# Patient Record
Sex: FEMALE | Race: WHITE | NOT HISPANIC OR LATINO | Employment: OTHER | URBAN - METROPOLITAN AREA
[De-identification: names, ages, dates, MRNs, and addresses within clinical notes are randomized per-mention and may not be internally consistent; named-entity substitution may affect disease eponyms.]

---

## 2017-07-05 ENCOUNTER — OFFICE VISIT (OUTPATIENT)
Dept: OTOLARYNGOLOGY | Facility: CLINIC | Age: 67
End: 2017-07-05

## 2017-07-05 DIAGNOSIS — H71.93 CHOLESTEATOMA, BILATERAL: ICD-10-CM

## 2017-07-05 DIAGNOSIS — H90.6 MIXED HEARING LOSS, BILATERAL: Primary | ICD-10-CM

## 2017-07-05 ASSESSMENT — PAIN SCALES - GENERAL: PAINLEVEL: NO PAIN (0)

## 2017-07-05 NOTE — LETTER
"7/5/2017       RE: Valentina Pereyra  [address] [place] [address]  [place] [address]     Dear Colleague,    Thank you for referring your patient, Valentina Pereyra, to the [facility] EAR NOSE AND THROAT at [facility]. Please see a copy of my visit note below.    This is a 66-year-old woman who is seen in follow-up. She has had a long history of ear disease. Her right ear was operated on a number of years ago and has not changed. Her left ear has a Bondy mastoidectomy and she continues to have some hearing loss. When she manipulates her ear, she can improve her hearing in the left ear. The patient uses a bone-anchored hearing aid on the right. Recently she again has had an episode of \"dizziness\" and these episodes are short in duration. They do not associate in anyway with abnormal perception of motion suggestive of vertigo.    Exam: Exam today and her the microscope shows that the right drum looks exactly the same. It has no features and has a white center. The anterior surfaces blunted. A single small piece of cerumen was removed with an alligator.    The left ear has a myringostapediopexy and is thin and monomeric. The remainder of the drum is intact and looks healthy. The epitympanum still has some scar posteriorly there requires cleaning. There is no evidence for recurrent cholesteatoma or other infection. The good tube in the anterior inferior quadrant appears to be clean and is still in place.    While she was under the microscope I asked her to manipulate the year and I think that she lateralizes the drum slightly enough that it comes off of the end of the incus.    Audio: Late last year when we last recorded the audio she had a left hearing thresholds around 25 dB by PTA. The right ear continues to have a maximal conductive hearing loss. I have not recheck the audio today.    Impression: Possible decline in a better hearing left ear. I have talked with her about the possibility of " getting a new audio, getting a new scan and considering a cartilage tympanoplasty on the left were a bone-anchored hearing aid on the left. Considering that functionally this is her better hearing ear hesitate for surgical intervention. I am recommended that she see me again in 6 months and reevaluate her audio at that time.    Again, thank you for allowing me to participate in the care of your patient.      Sincerely,    Bruno Tijerina MD

## 2017-07-05 NOTE — MR AVS SNAPSHOT
After Visit Summary   7/5/2017    Valentina Pereyra    MRN: [id number]           Patient Information     Date Of Birth          1950        Visit Information        Provider Department      7/5/2017 10:45 AM Bruno Tijerina MD [facility] Ear Nose and Throat        Today's Diagnoses     Mixed hearing loss, bilateral    -  1    Cholesteatoma, bilateral          Care Instructions    You will need  to schedule a follow up appointment in 6 months with and audio.   Please call our clinic for any questions,concerns,or worsening symptoms.      Clinic #[phone]       Option 3  for the triage nurse.          Follow-ups after your visit        Additional Services     AUDIOLOGY ADULT REFERRAL       Your provider has referred you to: [facility] [phone]   Fax [phone]    Treatment:  Evaluation & Treatment  Specialty Testing:  Audiogram w/Tymps and Reflexes  PT WILL MANIPULATE EAR AND CAN CHANGE HEARING- PLEASE DOCUMENT CHANGES.    Please be aware that coverage of these services is subject to the terms and limitations of your health insurance plan.  Call member services at your health plan with any benefit or coverage questions.      Please bring the following to your appointment:  >>   Any x-rays, CTs or MRIs which have been performed.  Contact the facility where they were done to arrange for  prior to your scheduled appointment.  Any new CT, MRI or other procedures ordered by your specialist must be performed at a [facility] facility or coordinated by your clinic's referral office.   >>   List of current medications  >>   This referral request   >>   Any documents/labs given to you for this referral                  Your next 10 appointments already scheduled     Jan 05, 2018  9:30 AM CST   Walk In From ENT with [facility] Audiology ([facility])    [address] [place] [address]    [phone]            Jan 05, 2018 10:30 AM CST   (Arrive by 10:15 AM)   Return Visit with Bruno Tijerina MD   [facility] Ear Nose and Throat ([facility])    [address] [address] [phone]              Future tests that were ordered for you today     Open Future Orders        Priority Expected Expires Ordered    AUDIOLOGY ADULT REFERRAL Routine  1/1/2018 7/5/2017            Who to contact     Please call your clinic at [phone] to:    Ask questions about your health    Make or cancel appointments    Discuss your medicines    Learn about your test results    Speak to your doctor   If you have compliments or concerns about an experience at your clinic, or if you wish to file a complaint, please contact [facility] Patient Relations at [phone] or email us at [email or web address]         Additional Information About Your Visit        Grow the [employer] Information     [employer] gives you secure access to your electronic health record. If you see a primary care provider, you can also send messages to your care team and make appointments. If you have questions, please call your primary care clinic.  If you do not have a primary care provider, please call [phone] and they will assist you.      [employer] is an electronic gateway that provides easy, online access to your medical records. With [employer], you can request a clinic appointment, read your test results, renew a prescription or communicate with your care team.     To access your existing account, please contact your [facility] or call [phone] for assistance.        Care EveryWhere ID     This is your Care EveryWhere ID. This could be used by other organizations to access your [place] medical records  [id number]         Blood Pressure from Last 3 Encounters:   10/23/14 134/74   04/08/14 118/63   04/03/13 116/65    Weight  from Last 3 Encounters:   11/23/16 81.1 kg (178 lb 12.8 oz)   10/23/14 76.3 kg (168 lb 3.4 oz)   04/08/14 75.1 kg (165 lb 9.1 oz)               Primary Care Provider Office Phone # Fax #    German Zayas PA-C [phone]       [facility] [place] [address] [facility] 100  [facility] [address]        Equal Access to Services     ELSA KARIMI : Hadii aad ku hadasho Soomaali, waaxda luqadaha, qaybta kaalmada adeegyada, waxay idiin hayaan adeeg kharaedenilson lageronimo . So [facility] [phone].    ATENCIÓN: Si habla español, tiene a velásquez disposición servicios gratuitos de asistencia lingüística. [facility] [phone].    We comply with applicable federal civil rights laws and [place] laws. We do not discriminate on the basis of race, color, national origin, age, disability sex, sexual orientation or gender identity.            Thank you!     Thank you for choosing [facility] EAR NOSE AND THROAT  for your care. Our goal is always to provide you with excellent care. Hearing back from our patients is one way we can continue to improve our services. Please take a few minutes to complete the written survey that you may receive in the mail after your visit with us. Thank you!             Your Updated Medication List - Protect others around you: Learn how to safely use, store and throw away your medicines at www.disposemymeds.org.          This list is accurate as of: 7/5/17 10:50 AM.  Always use your most recent med list.                   Brand Name Dispense Instructions for use Diagnosis    ALPRAZolam 0.5 MG 24 hr tablet    XANAX XR     Take 0.5 mg by mouth daily as needed        ASPIR-81 PO      Take 81 mg by mouth daily        calcium carbonate-vitamin D 600-400 MG-UNIT Chew      Take 1 tablet by mouth daily        dicyclomine 10 MG capsule    BENTYL     Take 10 mg by mouth daily as needed        escitalopram 20 MG tablet    LEXAPRO     Take 20 mg by mouth daily.        FOSAMAX 70 MG tablet   Generic drug:  alendronate       Take 70 mg by mouth every 7 days Mondays        PROBIOTIC DAILY PO           VITAMIN D3 PO      Take 1,000 Units by mouth daily

## 2017-07-05 NOTE — PATIENT INSTRUCTIONS
You will need  to schedule a follow up appointment in 6 months with and audio.   Please call our clinic for any questions,concerns,or worsening symptoms.      Clinic #[phone]       Option 3  for the triage nurse.

## 2017-07-05 NOTE — PROGRESS NOTES
"This is a 66-year-old woman who is seen in follow-up. She has had a long history of ear disease. Her right ear was operated on a number of years ago and has not changed. Her left ear has a Bondy mastoidectomy and she continues to have some hearing loss. When she manipulates her ear, she can improve her hearing in the left ear. The patient uses a bone-anchored hearing aid on the right. Recently she again has had an episode of \"dizziness\" and these episodes are short in duration. They do not associate in anyway with abnormal perception of motion suggestive of vertigo.    Exam: Exam today and her the microscope shows that the right drum looks exactly the same. It has no features and has a white center. The anterior surfaces blunted. A single small piece of cerumen was removed with an alligator.    The left ear has a myringostapediopexy and is thin and monomeric. The remainder of the drum is intact and looks healthy. The epitympanum still has some scar posteriorly there requires cleaning. There is no evidence for recurrent cholesteatoma or other infection. The good tube in the anterior inferior quadrant appears to be clean and is still in place.    While she was under the microscope I asked her to manipulate the year and I think that she lateralizes the drum slightly enough that it comes off of the end of the incus.    Audio: Late last year when we last recorded the audio she had a left hearing thresholds around 25 dB by PTA. The right ear continues to have a maximal conductive hearing loss. I have not recheck the audio today.    Impression: Possible decline in a better hearing left ear. I have talked with her about the possibility of getting a new audio, getting a new scan and considering a cartilage tympanoplasty on the left were a bone-anchored hearing aid on the left. Considering that functionally this is her better hearing ear hesitate for surgical intervention. I am recommended that she see me again in 6 months " and reevaluate her audio at that time.

## 2017-07-11 LAB — MAMMOGRAM: NORMAL

## 2018-01-05 ENCOUNTER — OFFICE VISIT (OUTPATIENT)
Dept: OTOLARYNGOLOGY | Facility: CLINIC | Age: 68
End: 2018-01-05
Payer: COMMERCIAL

## 2018-01-05 ENCOUNTER — OFFICE VISIT (OUTPATIENT)
Dept: AUDIOLOGY | Facility: CLINIC | Age: 68
End: 2018-01-05
Payer: COMMERCIAL

## 2018-01-05 DIAGNOSIS — H90.6 MIXED HEARING LOSS, BILATERAL: Primary | ICD-10-CM

## 2018-01-05 DIAGNOSIS — H69.93 DYSFUNCTION OF BOTH EUSTACHIAN TUBES: Primary | ICD-10-CM

## 2018-01-05 ASSESSMENT — PAIN SCALES - GENERAL: PAINLEVEL: NO PAIN (0)

## 2018-01-05 NOTE — PROGRESS NOTES
This is a 67-year-old patient who is seen with continued hearing loss.    History of Present Illness:: The patient has had a long history of ear infections and problems. She is using a bone anchored hearing aid on the right and has a lateralized drum on that side. She is had a long-standing conductive hearing loss which is maximal on the right. On the left side she has a tube that is in place and has a history of a myringoincudopexy on the left. This is her better hearing ear and she notes that the hearing seems to fluctuate bipolaring on her earlobe. When she does this she experiences some improvement in her hearing.    Medically there are no new findings and she is not on any other new medications.    She has not had any new symptoms.    Examination: Exam under the operating microscope shows the lateralized drum on the right is status quo. The Baha site is okay. The left ear has a Lukas tube in place and it is encased in cerumen. The cerumen partially obstructing my view of the drum. In manipulating it though the patient experienced pain and I cannot effectively see completely around it. I note that when she pulls on her earlobe that the wax separates from the ear canal wall.    Audiogram: A repeat audio was obtained today and shows that there is a maximal conductive hearing loss in the right ear and the left ear has very little change in the hearing threshold. It was previously a pure-tone average of 38 dB and today it is 37 dB.    Impression: Eustachian tube dysfunction on the left which is controlled by a PE tube. The tube appears to be partially extruding and the cerumen is causing some conductive loss.    Plan: I talked with her about the option of removing it today or waiting for it to completely extrude on its own. I plan to see her again in about six months time and we will continue to observe the tube.      ELZBIETA/ms

## 2018-01-05 NOTE — PROGRESS NOTES
"AUDIOLOGY REPORT    SUBJECTIVE:  Valentina Pereyra is a 67 year old female who was seen in the Audiology Clinic at the [facility] for audiologic evaluation and hearing aid check, referred by Bruno Tijerina MD.  The patient has been seen previously in this clinic on 11/23/16 for assessment and results indicated a bilateral asymmetric mixed hearing loss.  A patient utilizes a right Oticon Ponto Plus Bone anchored hearing aid (BAHA). The patient reports that hearing is relatively stable but noted that if she tugs on her left ear, the hearing seems to improve.  She also reports dizziness which she stated has been ongoing for a long time but that it is stable and has not caused any falls. The patient denies bilateral tinnitus, drainage in the right ear, bilateral drainage, and bilateral aural fullness.  The patient notes difficulty with communication in a variety of listening situations when not using her BAHA.    She reports that last Fall 2017, she was experiencing some feedback and \"echo\" issues with the BAHA.  However, she reports that the problem has since resolved itself.    OBJECTIVE:  Fall Risk Screen:  1. Have you fallen two or more times in the past year? No  2. Have you fallen and had an injury in the past year? No    Otoscopic exam indicates ears are clear of cerumen bilaterally     Pure Tone Thresholds assessed using conventional audiometry with good reliability from 250-8000 Hz bilaterally using insert earphones and circumaural headphones     RIGHT:  Moderately-severe to profound mixed hearing loss    LEFT:    Mild rising to normal sloping to severe mixed hearing loss    Tympanogram:  Did not test due to surgical hx    Reflexes (reported by stimulus ear):  Did not test due to surgical hx    Speech Reception Threshold:    RIGHT: 65 dB HL    LEFT:   30 dB HL    Word Recognition Score:     RIGHT: 100% at 95 dB HL using NU-6 recorded word list.    LEFT:   100% at 70 " dB HL using NU-6 recorded word list.    A BAHA check was performed.  The abutment site was visualized and was free of any debris.  The BAHA was clean and all parts were functioning normally.  The BAHA fit tightly and appropriately on to the abutment.  A listening check revealed that the device sounded crisp and clear with no distortion or weakness detected.  Patient reported that she was very happy with the sound quality of the device and did not want any programming changes.  It was discussed that the device could be sent in for repair for the problems that she was experiencing last Fall, but because the problems resolved themselves, she did not wish to send it in to the . Valentina reports that she continues to receive good benefit from the device and is overall very happy with the device's performance.      ASSESSMENT:     Compared to patient's previous audiogram dated 11/23/16, hearing has decreased 20 dB at 4 kHz in the right ear, all other thresholds are within 10 dB of the previous evaluation. Today s results were discussed with the patient in detail. A check of the bone anchored hearing aid was performed and the device was found to be functioning well.    PLAN:  Patient was counseled regarding hearing loss and impact on communication.  It is recommended that the patient follow-up with Dr. Tijerina.  It was also recommended that she have her hearing evaluated as medically indicated, sooner if concerns arise.  Patient is welcome to return to the clinic as needed for any BAHA device concerns.  Please call this clinic with questions regarding these results or recommendations.        Andreina Kimball  Audiologist  [place] License  #[id number]

## 2018-01-05 NOTE — LETTER
1/5/2018      RE: Valentina Pereyra  [address] [place] [address]  [place] [address]       This is a 67-year-old patient who is seen with continued hearing loss.    History of Present Illness:: The patient has had a long history of ear infections and problems. She is using a bone anchored hearing aid on the right and has a lateralized drum on that side. She is had a long-standing conductive hearing loss which is maximal on the right. On the left side she has a tube that is in place and has a history of a myringoincudopexy on the left. This is her better hearing ear and she notes that the hearing seems to fluctuate bipolaring on her earlobe. When she does this she experiences some improvement in her hearing.    Medically there are no new findings and she is not on any other new medications.    She has not had any new symptoms.    Examination: Exam under the operating microscope shows the lateralized drum on the right is status quo. The Baha site is okay. The left ear has a Lukas tube in place and it is encased in cerumen. The cerumen partially obstructing my view of the drum. In manipulating it though the patient experienced pain and I cannot effectively see completely around it. I note that when she pulls on her earlobe that the wax separates from the ear canal wall.    Audiogram: A repeat audio was obtained today and shows that there is a maximal conductive hearing loss in the right ear and the left ear has very little change in the hearing threshold. It was previously a pure-tone average of 38 dB and today it is 37 dB.    Impression: Eustachian tube dysfunction on the left which is controlled by a PE tube. The tube appears to be partially extruding and the cerumen is causing some conductive loss.    Plan: I talked with her about the option of removing it today or waiting for it to completely extrude on its own. I plan to see her again in about six months time and we will continue to observe the tube.      ELZBIETA/ms Carr  ALICIA Tijerina MD

## 2018-01-05 NOTE — MR AVS SNAPSHOT
After Visit Summary   1/5/2018    Valentina Pereyra    MRN: [id number]           Patient Information     Date Of Birth          1950        Visit Information        Provider Department      1/5/2018 10:30 AM Bruno Tijerina MD [facility] Ear Nose and Throat        Care Instructions    You will need  to schedule a follow up appointment in 6 months   Please call our clinic for any questions,concerns,or worsening symptoms.      Clinic #[phone]       Option 3  for the triage nurse.          Follow-ups after your visit        Your next 10 appointments already scheduled     Jul 06, 2018 10:30 AM CDT   (Arrive by 10:15 AM)   RETURN NEUROTOLOGY with MD GEORGIE Maldonado [facility] Ear Nose and Throat ([facility])    [address] [facility]  [address] [facility] [address] [phone]              Who to contact     Please call your clinic at [phone] to:    Ask questions about your health    Make or cancel appointments    Discuss your medicines    Learn about your test results    Speak to your doctor   If you have compliments or concerns about an experience at your clinic, or if you wish to file a complaint, please contact [facility] Patient Relations at [phone] or email us at [email or web address]         Additional Information About Your Visit        MyChart Information     [employer] gives you secure access to your electronic health record. If you see a primary care provider, you can also send messages to your care team and make appointments. If you have questions, please call your primary care clinic.  If you do not have a primary care provider, please call [phone] and they will assist you.      [employer] is an electronic gateway that provides easy, online access to your medical records. With [employer], you can request a clinic appointment, read your test results, renew a prescription or communicate with your care team.     To  Orthopedic access your existing account, please contact your [facility] or call [phone] for assistance.        Care EveryWhere ID     This is your Care EveryWhere ID. This could be used by other organizations to access your [place] medical records  [id number]         Blood Pressure from Last 3 Encounters:   10/23/14 134/74   04/08/14 118/63   04/03/13 116/65    Weight from Last 3 Encounters:   11/23/16 81.1 kg (178 lb 12.8 oz)   10/23/14 76.3 kg (168 lb 3.4 oz)   04/08/14 75.1 kg (165 lb 9.1 oz)              Today, you had the following     No orders found for display       Primary Care Provider Office Phone # Fax #    German Zayas PA-C [phone]       [facility] [address] [facility] [address]  [facility] [address]        Equal Access to Services     ALBINO KARIMI : Hadii aad ku hadasho Soomaali, waaxda luqadaha, qaybta kaalmada adeegyada, waxay idiin hayaan adeeg bird da silva . So [facility] [phone].    ATENCIÓN: Si habla español, tiene a velásquez disposición servicios gratuitos de asistencia lingüística. Connie al [phone].    We comply with applicable federal civil rights laws and [place] laws. We do not discriminate on the basis of race, color, national origin, age, disability, sex, sexual orientation, or gender identity.            Thank you!     Thank you for choosing [facility] EAR NOSE AND THROAT  for your care. Our goal is always to provide you with excellent care. Hearing back from our patients is one way we can continue to improve our services. Please take a few minutes to complete the written survey that you may receive in the mail after your visit with us. Thank you!             Your Updated Medication List - Protect others around you: Learn how to safely use, store and throw away your medicines at www.disposemymeds.org.          This list is accurate as of: 1/5/18 10:53 AM.  Always use your most recent med list.                   Brand Name Dispense  Instructions for use Diagnosis    ALPRAZolam 0.5 MG 24 hr tablet    XANAX XR     Take 0.5 mg by mouth daily as needed        ASPIR-81 PO      Take 81 mg by mouth daily        calcium carbonate-vitamin D 600-400 MG-UNIT Chew      Take 1 tablet by mouth daily        dicyclomine 10 MG capsule    BENTYL     Take 10 mg by mouth daily as needed        escitalopram 20 MG tablet    LEXAPRO     Take 20 mg by mouth daily.        FOSAMAX 70 MG tablet   Generic drug:  alendronate      Take 70 mg by mouth every 7 days Mondays        PROBIOTIC DAILY PO           VITAMIN D3 PO      Take 1,000 Units by mouth daily

## 2018-01-05 NOTE — LETTER
1/5/2018     RE: Valentina Pereyra  [address] [place] [address]  [place] [address]     Dear Colleague,    Thank you for referring your patient, Valentina Pereyra, to the [facility] EAR NOSE AND THROAT at [facility]. Please see a copy of my visit note below.    This is a 67-year-old patient who is seen with continued hearing loss.    History of Present Illness:: The patient has had a long history of ear infections and problems. She is using a bone anchored hearing aid on the right and has a lateralized drum on that side. She is had a long-standing conductive hearing loss which is maximal on the right. On the left side she has a tube that is in place and has a history of a myringoincudopexy on the left. This is her better hearing ear and she notes that the hearing seems to fluctuate bipolaring on her earlobe. When she does this she experiences some improvement in her hearing.    Medically there are no new findings and she is not on any other new medications.    She has not had any new symptoms.    Examination: Exam under the operating microscope shows the lateralized drum on the right is status quo. The Baha site is okay. The left ear has a Lukas tube in place and it is encased in cerumen. The cerumen partially obstructing my view of the drum. In manipulating it though the patient experienced pain and I cannot effectively see completely around it. I note that when she pulls on her earlobe that the wax separates from the ear canal wall.    Audiogram: A repeat audio was obtained today and shows that there is a maximal conductive hearing loss in the right ear and the left ear has very little change in the hearing threshold. It was previously a pure-tone average of 38 dB and today it is 37 dB.    Impression: Eustachian tube dysfunction on the left which is controlled by a PE tube. The tube appears to be partially extruding and the cerumen is causing some conductive loss.    Plan: I talked with her  about the option of removing it today or waiting for it to completely extrude on its own. I plan to see her again in about six months time and we will continue to observe the tube.      SL/ms  Bruno Tijerina MD

## 2018-01-05 NOTE — PATIENT INSTRUCTIONS
You will need  to schedule a follow up appointment in 6 months   Please call our clinic for any questions,concerns,or worsening symptoms.      Clinic #[phone]       Option 3  for the triage nurse.

## 2018-01-05 NOTE — MR AVS SNAPSHOT
After Visit Summary   1/5/2018    Valentina Pereyra    MRN: [id number]           Patient Information     Date Of Birth          1950        Visit Information        Provider Department      1/5/2018 8:30 AM Sonja Weller AuD [facility] Audiology        Today's Diagnoses     Mixed hearing loss, bilateral    -  1       Follow-ups after your visit        Your next 10 appointments already scheduled     Jul 06, 2018 10:30 AM CDT   (Arrive by 10:15 AM)   RETURN NEUROTOLOGY with Bruno Tijerina MD   [facility] Ear Nose and Throat ([facility] [place])    [address] [place] [address] [phone]              Who to contact     Please call your clinic at [phone] to:    Ask questions about your health    Make or cancel appointments    Discuss your medicines    Learn about your test results    Speak to your doctor   If you have compliments or concerns about an experience at your clinic, or if you wish to file a complaint, please contact [facility] Physicians Patient Relations at [phone] or email us at [email or web address]         Additional Information About Your Visit        iTaggithart Information     [employer] gives you secure access to your electronic health record. If you see a primary care provider, you can also send messages to your care team and make appointments. If you have questions, please call your primary care clinic.  If you do not have a primary care provider, please call [phone] and they will assist you.      [employer] is an electronic gateway that provides easy, online access to your medical records. With [employer], you can request a clinic appointment, read your test results, renew a prescription or communicate with your care team.     To access your existing account, please contact your [facility] Clinic or call [phone] for assistance.        Care EveryWhere ID     This is your  Care EveryWhere ID. This could be used by other organizations to access your [place] medical records  [id number]         Blood Pressure from Last 3 Encounters:   10/23/14 134/74   04/08/14 118/63   04/03/13 116/65    Weight from Last 3 Encounters:   11/23/16 81.1 kg (178 lb 12.8 oz)   10/23/14 76.3 kg (168 lb 3.4 oz)   04/08/14 75.1 kg (165 lb 9.1 oz)              We Performed the Following     AUDIOGRAM/TYMPANOGRAM - INTERFACE     BAHA - Fit or F/U (77234)     [facility] Thr[facility] Eval & Speech Recog (63252)        Primary Care Provider Office Phone # Fax #    German Zayas PA-C [phone]       [facility] [address] [facility] [address]  [facility] [address]        Equal Access to Services     ALBINO KARIMI : Hadii aad ku hadasho Soomaali, waaxda luqadaha, qaybta kaalmada adeegyada, waxay idiin hayaan constantin da silva . So [facility] [phone].    ATENCIÓN: Si habla español, tiene a velásquez disposición servicios gratuitos de asistencia lingüística. Connie al [phone].    We comply with applicable federal civil rights laws and [place] laws. We do not discriminate on the basis of race, color, national origin, age, disability, sex, sexual orientation, or gender identity.            Thank you!     Thank you for choosing [facility] AUDIOLOGY  for your care. Our goal is always to provide you with excellent care. Hearing back from our patients is one way we can continue to improve our services. Please take a few minutes to complete the written survey that you may receive in the mail after your visit with us. Thank you!             Your Updated Medication List - Protect others around you: Learn how to safely use, store and throw away your medicines at www.disposemymeds.org.          This list is accurate as of: 1/5/18  2:14 PM.  Always use your most recent med list.                   Brand Name Dispense Instructions for use Diagnosis    ALPRAZolam 0.5 MG 24 hr tablet    XANAX XR     Take 0.5 mg by  mouth daily as needed        ASPIR-81 PO      Take 81 mg by mouth daily        calcium carbonate-vitamin D 600-400 MG-UNIT Chew      Take 1 tablet by mouth daily        dicyclomine 10 MG capsule    BENTYL     Take 10 mg by mouth daily as needed        escitalopram 20 MG tablet    LEXAPRO     Take 20 mg by mouth daily.        FOSAMAX 70 MG tablet   Generic drug:  alendronate      Take 70 mg by mouth every 7 days Mondays        PROBIOTIC DAILY PO           VITAMIN D3 PO      Take 1,000 Units by mouth daily

## 2018-07-06 ENCOUNTER — OFFICE VISIT (OUTPATIENT)
Dept: OTOLARYNGOLOGY | Facility: CLINIC | Age: 68
End: 2018-07-06
Payer: COMMERCIAL

## 2018-07-06 VITALS — WEIGHT: 180 LBS | HEIGHT: 69 IN | BODY MASS INDEX: 26.66 KG/M2

## 2018-07-06 DIAGNOSIS — H69.93 DYSFUNCTION OF BOTH EUSTACHIAN TUBES: Primary | ICD-10-CM

## 2018-07-06 DIAGNOSIS — H90.6 MIXED HEARING LOSS, BILATERAL: ICD-10-CM

## 2018-07-06 RX ORDER — ASPIRIN 81 MG/1
TABLET, CHEWABLE ORAL
COMMUNITY
End: 2019-07-09

## 2018-07-06 ASSESSMENT — PAIN SCALES - GENERAL: PAINLEVEL: NO PAIN (0)

## 2018-07-06 NOTE — LETTER
7/6/2018      RE: Valentina Pereyra  [address] [place] [address]  [place] [address]       History of Present Illness:  This is a 67-year-old patient who has bilateral ear problems. The patient notes that her hearing seems to be just a little worse in her left ear but otherwise she has been doing fairly well.    Her last surgical procedure, which was October 2014, was a left wall down tympanomastoidectomy. A Lukas T-tube was placed as part of this. Since then, she notes that when she manipulates the ear sometimes she can hear better with it other times less well. She has had no drainage or pain from her right ear    Physical Examination: The exam with the microscope shows that the left wall down cavity is clean and looks mature. There is a significant amount of cerumen surrounding the tube and most of it was removed today. The drum has collapsed and now has a myringoincudopexy there is also contact with the inferior part just below the stapes. The tube appears to be in the middle ear and is functional. The anterior drum looks healthier. The remainder of the mastoid cavity has mature skin and there is a thin membrane covering the sinodural angle.    On the right side, the drum is lateralized but there is a white mass inferiorly in the ear. I have seen this before and noted it. I do not think that it has changed substantially. This ear was last operated on in April 2012 with a revision procedure and a TORP. I think that I am looking at the end of the TORP on the right ear.    Impression: Stable eustachian tube dysfunction bilaterally with mostly conductive hearing loss bilaterally.    Plan: I talked with her about the findings and recommended reexamination in six months.      SL/ms    Bruno Tijerina MD

## 2018-07-06 NOTE — PATIENT INSTRUCTIONS
You will need  to schedule a follow up appointment in 6 months for ear cleaning.   Please call our clinic for any questions,concerns,or worsening symptoms.      Clinic #[phone]       Option 3  for the triage nurse.

## 2018-07-06 NOTE — NURSING NOTE
Chief Complaint   Patient presents with     RECHECK     6 month follow up. hearing in left ear feels worse.      Ozzy Morris, EMT

## 2018-07-06 NOTE — MR AVS SNAPSHOT
"              After Visit Summary   7/6/2018    Valentina Pereyra    MRN: [id number]           Patient Information     Date Of Birth          1950        Visit Information        Provider Department      7/6/2018 10:30 AM Bruno Tijerina MD M [facility] Ear Nose and Throat         Follow-ups after your visit        Your next 10 appointments already scheduled     Jan 04, 2019 10:45 AM CST   (Arrive by 10:30 AM)   RETURN NEUROTOLOGY with MD GEORGIE Maldonado [facility] Ear Nose and Throat ([facility])    [address] [place] [address] [phone]              Who to contact     Please call your clinic at [phone] to:    Ask questions about your health    Make or cancel appointments    Discuss your medicines    Learn about your test results    Speak to your doctor            Additional Information About Your Visit        MyChart Information     [employer] gives you secure access to your electronic health record. If you see a primary care provider, you can also send messages to your care team and make appointments. If you have questions, please call your primary care clinic.  If you do not have a primary care provider, please call [phone] and they will assist you.      [employer] is an electronic gateway that provides easy, online access to your medical records. With [employer], you can request a clinic appointment, read your test results, renew a prescription or communicate with your care team.     To access your existing account, please contact your [facility] Clinic or call [phone] for assistance.        Care EveryWhere ID     This is your Care EveryWhere ID. This could be used by other organizations to access your [place] medical records  [id number]        Your Vitals Were     Height BMI (Body Mass Index)                1.753 m (5' 9\") 26.58 kg/m2           Blood Pressure from Last 3 Encounters:   10/23/14 134/74   04/08/14 118/63 "   04/03/13 116/65    Weight from Last 3 Encounters:   07/06/18 81.6 kg (180 lb)   11/23/16 81.1 kg (178 lb 12.8 oz)   10/23/14 76.3 kg (168 lb 3.4 oz)              Today, you had the following     No orders found for display       Primary Care Provider Office Phone # Fax #    Germananamaria Zayas PA-C [phone]       [facility] [address] [facility] [address]  [facility] [address]        Equal Access to Services     [facility]: Hadii aad ku hadasho Soomaali, waaxda luqadaha, qaybta kaalmada adeegyada, waxay jyotiin hayliliana ademac da silva . So [facility] [phone].    ATENCIÓN: Si habla español, tiene a velásquez disposición servicios gratuitos de asistencia lingüística. [facility] [phone].    We comply with applicable federal civil rights laws and [place] laws. We do not discriminate on the basis of race, color, national origin, age, disability, sex, sexual orientation, or gender identity.            Thank you!     Thank you for choosing [facility] EAR NOSE AND THROAT  for your care. Our goal is always to provide you with excellent care. Hearing back from our patients is one way we can continue to improve our services. Please take a few minutes to complete the written survey that you may receive in the mail after your visit with us. Thank you!             Your Updated Medication List - Protect others around you: Learn how to safely use, store and throw away your medicines at www.disposemymeds.org.          This list is accurate as of 7/6/18 11:08 AM.  Always use your most recent med list.                   Brand Name Dispense Instructions for use Diagnosis    ALPRAZolam 0.5 MG 24 hr tablet    XANAX XR     Take 0.5 mg by mouth daily as needed        * ASPIR-81 PO      Take 81 mg by mouth daily        * aspirin 81 MG chewable tablet           calcium carbonate-vitamin D 600-400 MG-UNIT Chew      Take 1 tablet by mouth daily        dicyclomine 10 MG capsule    BENTYL     Take 10 mg by mouth daily as needed         escitalopram 20 MG tablet    LEXAPRO     Take 20 mg by mouth daily.        FOSAMAX 70 MG tablet   Generic drug:  alendronate      Take 70 mg by mouth every 7 days Mondays        PROBIOTIC DAILY PO           VITAMIN D3 PO      Take 1,000 Units by mouth daily        * Notice:  This list has 2 medication(s) that are the same as other medications prescribed for you. Read the directions carefully, and ask your doctor or other care provider to review them with you.

## 2018-07-06 NOTE — LETTER
7/6/2018       RE: Valentina Pereyra  [address] [place] [address]  [place] [address]     Dear Colleague,    Thank you for referring your patient, Valentina Pereyra, to the [facility] EAR NOSE AND THROAT at [facility]. Please see a copy of my visit note below.    History of Present Illness:  This is a 67-year-old patient who has bilateral ear problems. The patient notes that her hearing seems to be just a little worse in her left ear but otherwise she has been doing fairly well.    Her last surgical procedure, which was October 2014, was a left wall down tympanomastoidectomy. A Lukas T-tube was placed as part of this. Since then, she notes that when she manipulates the ear sometimes she can hear better with it other times less well. She has had no drainage or pain from her right ear    Physical Examination: The exam with the microscope shows that the left wall down cavity is clean and looks mature. There is a significant amount of cerumen surrounding the tube and most of it was removed today. The drum has collapsed and now has a myringoincudopexy there is also contact with the inferior part just below the stapes. The tube appears to be in the middle ear and is functional. The anterior drum looks healthier. The remainder of the mastoid cavity has mature skin and there is a thin membrane covering the sinodural angle.    On the right side, the drum is lateralized but there is a white mass inferiorly in the ear. I have seen this before and noted it. I do not think that it has changed substantially. This ear was last operated on in April 2012 with a revision procedure and a TORP. I think that I am looking at the end of the TORP on the right ear.    Impression: Stable eustachian tube dysfunction bilaterally with mostly conductive hearing loss bilaterally.    Plan: I talked with her about the findings and recommended reexamination in six months.      SL/ms    Again, thank you for allowing me to  participate in the care of your patient.      Sincerely,    Bruno Tijerina MD

## 2018-07-06 NOTE — PROGRESS NOTES
History of Present Illness:  This is a 67-year-old patient who has bilateral ear problems. The patient notes that her hearing seems to be just a little worse in her left ear but otherwise she has been doing fairly well.    Her last surgical procedure, which was October 2014, was a left wall down tympanomastoidectomy. A Lukas T-tube was placed as part of this. Since then, she notes that when she manipulates the ear sometimes she can hear better with it other times less well. She has had no drainage or pain from her right ear    Physical Examination: The exam with the microscope shows that the left wall down cavity is clean and looks mature. There is a significant amount of cerumen surrounding the tube and most of it was removed today. The drum has collapsed and now has a myringoincudopexy there is also contact with the inferior part just below the stapes. The tube appears to be in the middle ear and is functional. The anterior drum looks healthier. The remainder of the mastoid cavity has mature skin and there is a thin membrane covering the sinodural angle.    On the right side, the drum is lateralized but there is a white mass inferiorly in the ear. I have seen this before and noted it. I do not think that it has changed substantially. This ear was last operated on in April 2012 with a revision procedure and a TORP. I think that I am looking at the end of the TORP on the right ear.    Impression: Stable eustachian tube dysfunction bilaterally with mostly conductive hearing loss bilaterally.    Plan: I talked with her about the findings and recommended reexamination in six months.      ELZBIETA/ms

## 2018-07-18 LAB — MAMMOGRAM: NORMAL

## 2018-12-21 ENCOUNTER — PATIENT OUTREACH (OUTPATIENT)
Dept: CARE COORDINATION | Facility: CLINIC | Age: 68
End: 2018-12-21

## 2019-01-04 ENCOUNTER — OFFICE VISIT (OUTPATIENT)
Dept: OTOLARYNGOLOGY | Facility: CLINIC | Age: 69
End: 2019-01-04
Payer: COMMERCIAL

## 2019-01-04 DIAGNOSIS — H90.6 MIXED CONDUCTIVE AND SENSORINEURAL HEARING LOSS OF BOTH EARS: ICD-10-CM

## 2019-01-04 DIAGNOSIS — H72.92 EAR DRUM PERFORATION, LEFT: Primary | ICD-10-CM

## 2019-01-04 DIAGNOSIS — H61.23 BILATERAL IMPACTED CERUMEN: ICD-10-CM

## 2019-01-04 ASSESSMENT — PAIN SCALES - GENERAL: PAINLEVEL: NO PAIN (0)

## 2019-01-04 NOTE — LETTER
1/4/2019       RE: Valentina Pereyra  [address] [facility]  [place] [address]     Dear Colleague,    Thank you for referring your patient, Valentina Pereyra, to the [facility] EAR NOSE AND THROAT at [facility]. Please see a copy of my visit note below.    This is a 68-year-old patient with known bilateral ear disease.  She has had a long-standing conductive hearing loss in her right ear which is not changed.  She reports that her BAHA works great.  Her left ear feels like it is getting a little bit worse.    Exam: Exam under the operating microscope shows that the right ear has the lateralized graft with the thick cartilage underneath it.  The anterior blunting is still obvious.  The left ear has a wall down mastoid cavity that was easily cleaned of dry cerumen.  An alligator forcep and right angle hook were used with a curette.  The good tube has some crusting around it and I was able to liberate that material using a straight pick and alligator.  The rest of the ear looks healthy.    There is no obvious drainage    Impression: Last year her audio showed the findings consistent with this pattern.  I do feel that the cavity needs to be cleaned a couple of times a year but otherwise she should be fine.  I did not order new audio today since her tuning fork test is consistent with her prior exam.    Plan: She will follow-up in approximately 6 months with 1 of my colleagues.    Again, thank you for allowing me to participate in the care of your patient.      Sincerely,    Bruno Tijerina MD

## 2019-01-04 NOTE — PATIENT INSTRUCTIONS
You will need  to schedule a follow up appointment in 6 months with Dr. Nissen with a new hearing test.        Please call our clinic for any questions,concerns,or worsening symptoms.      Clinic #[phone]       Option 3  for the ENT Care Team.       Option 1 for scheduling.    Caitie KAPLAN RNCC  [phone]

## 2019-01-04 NOTE — PROGRESS NOTES
This is a 68-year-old patient with known bilateral ear disease.  She has had a long-standing conductive hearing loss in her right ear which is not changed.  She reports that her BAHA works great.  Her left ear feels like it is getting a little bit worse.    Exam: Exam under the operating microscope shows that the right ear has the lateralized graft with the thick cartilage underneath it.  The anterior blunting is still obvious.  The left ear has a wall down mastoid cavity that was easily cleaned of dry cerumen.  An alligator forcep and right angle hook were used with a curette.  The good tube has some crusting around it and I was able to liberate that material using a straight pick and alligator.  The rest of the ear looks healthy.    There is no obvious drainage    Impression: Last year her audio showed the findings consistent with this pattern.  I do feel that the cavity needs to be cleaned a couple of times a year but otherwise she should be fine.  I did not order new audio today since her tuning fork test is consistent with her prior exam.    Plan: She will follow-up in approximately 6 months with 1 of my colleagues.

## 2019-01-05 PROBLEM — H90.6 MIXED CONDUCTIVE AND SENSORINEURAL HEARING LOSS OF BOTH EARS: Status: ACTIVE | Noted: 2019-01-05

## 2019-01-05 PROBLEM — H72.92 EAR DRUM PERFORATION, LEFT: Status: ACTIVE | Noted: 2019-01-05

## 2019-02-19 ENCOUNTER — TELEPHONE (OUTPATIENT)
Dept: AUDIOLOGY | Facility: CLINIC | Age: 69
End: 2019-02-19

## 2019-02-19 NOTE — TELEPHONE ENCOUNTER
"Spoke with patient, she reports that it has been a gradual increase of an \"echo\" over the last week that she only experiences when she talks. Discussed that she should make sure that the abutment it screwed tightly and that there is no debris or hair around the abutment. She expressed understanding.    If that does not resolve the echo then she should come in for some programming adjustments if she desires. The last option would be to send the device in for repair, the device is no longer in warranty and so it would be an out of pocket cost to repair. She expressed understanding and reported she would try cleaning the device and the abutment site well first before scheduling an appointment.      Sonja Weller, Andreina  Audiologist  [place] License  #[id number]    "

## 2019-02-19 NOTE — TELEPHONE ENCOUNTER
[facility] Call Center    Phone Message    May a detailed message be left on voicemail: yes    Reason for Call: Other: Pt called in today regarding her BAHA , she stated that she has been having some echoing that she has never experienced this before. Pt is not sure if she needs an appointment or not. Please advise when available    Action Taken: Message routed to:  [facility] ([facility] Audiology

## 2019-05-23 NOTE — TELEPHONE ENCOUNTER
FUTURE VISIT INFORMATION      FUTURE VISIT INFORMATION:    Date: 7/9/19    Time: 10:30 am ENT  9:30 am Audiology    [place] [facility]  [place]ERR[place] INFORMATION:    Referring provider:  Dr. Tijerina patient    Referring providers clinic:  [facility] ENT    Reason for visit/diagnosis  6 mo. Follow up with new hearing test    RECORDS REQUESTED FROM:       Clinic name Comments Records Status Imaging Status   [facility] ENT Office Visit-1/4/19, 7/6/18, 1/5/18, 7/5/17 [facility] Audiology Audiograms-1/5/18, 11/23/16, 2/17/16, 12/31/14, 7/9/14 [facility] CT Temporal orbital-1/2/13, 2/22/12 Epic PACS

## 2019-06-12 ENCOUNTER — DOCUMENTATION ONLY (OUTPATIENT)
Dept: CARE COORDINATION | Facility: CLINIC | Age: 69
End: 2019-06-12

## 2019-07-08 DIAGNOSIS — H91.90 HEARING LOSS, UNSPECIFIED HEARING LOSS TYPE, UNSPECIFIED LATERALITY: Primary | ICD-10-CM

## 2019-07-09 ENCOUNTER — OFFICE VISIT (OUTPATIENT)
Dept: OTOLARYNGOLOGY | Facility: CLINIC | Age: 69
End: 2019-07-09
Payer: COMMERCIAL

## 2019-07-09 ENCOUNTER — PRE VISIT (OUTPATIENT)
Dept: OTOLARYNGOLOGY | Facility: CLINIC | Age: 69
End: 2019-07-09

## 2019-07-09 ENCOUNTER — OFFICE VISIT (OUTPATIENT)
Dept: AUDIOLOGY | Facility: CLINIC | Age: 69
End: 2019-07-09
Attending: OTOLARYNGOLOGY
Payer: COMMERCIAL

## 2019-07-09 VITALS
HEART RATE: 50 BPM | WEIGHT: 189 LBS | DIASTOLIC BLOOD PRESSURE: 59 MMHG | BODY MASS INDEX: 27.06 KG/M2 | SYSTOLIC BLOOD PRESSURE: 113 MMHG | RESPIRATION RATE: 16 BRPM | HEIGHT: 70 IN | TEMPERATURE: 97.7 F

## 2019-07-09 DIAGNOSIS — H92.12 DRAINAGE FROM LEFT EAR: Primary | ICD-10-CM

## 2019-07-09 DIAGNOSIS — H90.6 MIXED HEARING LOSS, BILATERAL: Primary | ICD-10-CM

## 2019-07-09 DIAGNOSIS — H91.90 HEARING LOSS, UNSPECIFIED HEARING LOSS TYPE, UNSPECIFIED LATERALITY: ICD-10-CM

## 2019-07-09 PROBLEM — F41.0 PANIC ATTACK AS REACTION TO STRESS: Status: ACTIVE | Noted: 2019-07-09

## 2019-07-09 PROBLEM — Z86.0100 HISTORY OF COLONIC POLYPS: Status: ACTIVE | Noted: 2019-05-23

## 2019-07-09 PROBLEM — Z78.9 ENROLLED IN CHRONIC CARE MANAGEMENT: Status: ACTIVE | Noted: 2018-08-09

## 2019-07-09 PROBLEM — F41.1 GENERALIZED ANXIETY DISORDER: Status: ACTIVE | Noted: 2019-07-09

## 2019-07-09 PROBLEM — F43.0 PANIC ATTACK AS REACTION TO STRESS: Status: ACTIVE | Noted: 2019-07-09

## 2019-07-09 PROBLEM — K58.9 IBS (IRRITABLE BOWEL SYNDROME): Status: ACTIVE | Noted: 2019-07-09

## 2019-07-09 PROBLEM — R00.2 HEART PALPITATIONS: Status: ACTIVE | Noted: 2019-07-09

## 2019-07-09 ASSESSMENT — PAIN SCALES - GENERAL: PAINLEVEL: NO PAIN (0)

## 2019-07-09 ASSESSMENT — MIFFLIN-ST. JEOR: SCORE: 1457.55

## 2019-07-09 NOTE — NURSING NOTE
"Chief Complaint   Patient presents with     Consult     Right Conductive hearing loss,     Blood pressure 113/59, pulse 50, temperature 97.7  F (36.5  C), resp. rate 16, height 1.77 m (5' 9.69\"), weight 85.7 kg (189 lb).    Jhon Sagastume LPN    "

## 2019-07-09 NOTE — PROGRESS NOTES
Dear German Quiroz:    I had the pleasure of seeing Valentina Pereyra in followup today at the [facility].    CHIEF COMPLAINT: Ears    HISTORY OF PRESENT ILLNESS: Patient is a 68-year-old in today for follow-up.  She said multiple surgeries in both ears.  She had a right Baha placed in 2014.  2012 she had a last revision on the right side with a port placement.  She comes in about every 6 months for cleaning.  On her follow-up today, she has had some mild moisture in the left ear.  No active drainage.  She denies any dizziness.  No significant tinnitus.  There is no dysphagia, hoarseness, facial paresthesias.    MEDICATIONS: Please refer to the detailed medication reconciliation performed by my nurse today, which I have reviewed and signed.     ALLERGIES:    Allergies   Allergen Reactions     Nkda [No Known Drug Allergies]        HABITS: Social History    Substance and Sexual Activity      Alcohol use: Yes        Comment: 2-3/wk     History   Smoking Status     Never Smoker   Smokeless Tobacco     Never Used         PAST MEDICAL HISTORY:  Please see today's intake form (for the remainder of the PMH) which I reviewed and signed.  Past Medical History:   Diagnosis Date     Anxiety attack      Arthritis      Benign positional vertigo      Conductive hearing loss      Hearing loss     chronic bilat     Noninfectious ileitis     IBS     Obstructive sleep apnea      Osteoporosis      Palpitations      PONV (postoperative nausea and vomiting)      Renal disease     frequency,urgency     Sleep apnea     does not use CPAP     Vertigo        FAMILY HISTORY/SOCIAL HISTORY:    Family History   Problem Relation Age of Onset     Family History Negative Other      Heart Disease Father      Stomach Problem Mother      Osteoporosis Mother      Cancer Brother       Social History     Socioeconomic History     Marital status:      Spouse name: Not on file     Number of children: Not on file      Years of education: Not on file     Highest education level: Not on file   Occupational History     Not on file   Social Needs     Financial resource strain: Not on file     Food insecurity:     Worry: Not on file     Inability: Not on file     Transportation needs:     Medical: Not on file     Non-medical: Not on file   Tobacco Use     Smoking status: Never Smoker     Smokeless tobacco: Never Used   Substance and Sexual Activity     Alcohol use: Yes     Comment: 2-3/wk     Drug use: No     Sexual activity: Never   Lifestyle     Physical activity:     Days per week: Not on file     Minutes per session: Not on file     Stress: Not on file   Relationships     Social connections:     Talks on phone: Not on file     Gets together: Not on file     Attends Muslim service: Not on file     Active member of club or organization: Not on file     Attends meetings of clubs or organizations: Not on file     Relationship status: Not on file     Intimate partner violence:     Fear of current or ex partner: Not on file     Emotionally abused: Not on file     Physically abused: Not on file     Forced sexual activity: Not on file   Other Topics Concern     Not on file   Social History Narrative     Not on file       REVIEW OF SYSTEMS: Patient Supplied Answers to Review of Systems   ENT ROS 7/8/2019   Constitutional -   Neurology Dizzy spells   Psychology -   Ears, Nose, Throat Nasal congestion or drainage   Gastrointestinal/Genitourinary Constipation, Diarrhea   Musculoskeletal Sore or stiff joints   Endocrine -   Other Problems with anesthesia in surgery       The remainder of the 10 point ROS is negative    PHYSICIAL EXAMINATION:  Constitutional: The patient was well-groomed and in no acute distress.   Skin: Warm and pink.  Psychiatric: The patient's affect was calm, cooperative, and appropriate.   Respiratory: Breathing comfortably without stridor or exertion of accessory muscles.  Eyes: Pupils were equal and reactive.  Extraocular movement intact.   Head: Normocephalic and atraumatic. No lesions or scars.  Ears: Both ears examined.  She is placed on the microscope for cavity cleaning.  Under high-power magnification the right side was cleaned with curette.  Following cleaning, she does have a whitish area inferior aspect of the middle ear.  Looks stable.  Dr. Tijerina mentions this in his note and feels it is possibly the head of the TORP.  Stable appearance.  Tube in place and patent.  Right side has mild moisture which was cleaned with microscope and suction.  Some crusting was removed with curette and alligator forceps.  Following that the cavity looks intact and stable.  I can see the Lukas tube in place and patent inferiorly.  The implant for Baha looks clean and stable.  Nose: Sinuses were nontender. Anterior rhinoscopy revealed midline septum and absence of purulence or polyps.  Oral Cavity: Normal tongue, floor of mouth, buccal mucosa, and palate. No abnormal lymph tissue in the oropharynx.   Neck: The parotid is soft without masses. Supple with normal laryngeal and tracheal landmarks.   Lymphatic: There is no palpable lymphadenopathy or other masses in the neck.   Neurologic: Alert and oriented x 3. Cranial nerves III-XI within normal limits. Voice quality normal.  Cerebellar Function Tests:  Grossly normal    Audiogram: Audiogram today shows a left high-frequency sensorineural hearing loss above 2000 Hz.  Right side shows a maximal conductive component through all frequencies.  Excellent discrimination at 92% on the right and 100% on the left.    IMPRESSION AND PLAN:   1. Bilateral mastoid cavities: Stable, no further treatment needed, monitor.   2. Left ear drainage: Cleaned today, stable, no treatment needed.  3. Right conductive hearing loss: Stable, continue with Baha.    Patient will follow-up in 6 months for continued monitoring and cleaning.    Thank you very much for the opportunity to participate in the care of  your patient.    Rick L Nissen MD

## 2019-07-09 NOTE — LETTER
7/9/2019       RE: Valentina Pereyra  [address] [place] [id number]     Dear Colleague,    Thank you for referring your patient, Valentina Pereyra, to the [facility] EAR NOSE AND THROAT at [facility]. Please see a copy of my visit note below.    Dear German Quiroz:    I had the pleasure of seeing Valentina Pereyra in followup today at the [facility].    CHIEF COMPLAINT: Ears    HISTORY OF PRESENT ILLNESS: Patient is a 68-year-old in today for follow-up.  She said multiple surgeries in both ears.  She had a right Baha placed in 2014.  2012 she had a last revision on the right side with a port placement.  She comes in about every 6 months for cleaning.  On her follow-up today, she has had some mild moisture in the left ear.  No active drainage.  She denies any dizziness.  No significant tinnitus.  There is no dysphagia, hoarseness, facial paresthesias.    MEDICATIONS: Please refer to the detailed medication reconciliation performed by my nurse today, which I have reviewed and signed.     ALLERGIES:    Allergies   Allergen Reactions     Nkda [No Known Drug Allergies]        HABITS: Social History    Substance and Sexual Activity      Alcohol use: Yes        Comment: 2-3/wk     History   Smoking Status     Never Smoker   Smokeless Tobacco     Never Used         PAST MEDICAL HISTORY:  Please see today's intake form (for the remainder of the PMH) which I reviewed and signed.  Past Medical History:   Diagnosis Date     Anxiety attack      Arthritis      Benign positional vertigo      Conductive hearing loss      Hearing loss     chronic bilat     Noninfectious ileitis     IBS     Obstructive sleep apnea      Osteoporosis      Palpitations      PONV (postoperative nausea and vomiting)      Renal disease     frequency,urgency     Sleep apnea     does not use CPAP     Vertigo        FAMILY HISTORY/SOCIAL HISTORY:    Family History   Problem Relation  Age of Onset     Family History Negative Other      Heart Disease Father      Stomach Problem Mother      Osteoporosis Mother      Cancer Brother       Social History     Socioeconomic History     Marital status:      Spouse name: Not on file     Number of children: Not on file     Years of education: Not on file     Highest education level: Not on file   Occupational History     Not on file   Social Needs     Financial resource strain: Not on file     Food insecurity:     Worry: Not on file     Inability: Not on file     Transportation needs:     Medical: Not on file     Non-medical: Not on file   Tobacco Use     Smoking status: Never Smoker     Smokeless tobacco: Never Used   Substance and Sexual Activity     Alcohol use: Yes     Comment: 2-3/wk     Drug use: No     Sexual activity: Never   Lifestyle     Physical activity:     Days per week: Not on file     Minutes per session: Not on file     Stress: Not on file   Relationships     Social connections:     Talks on phone: Not on file     Gets together: Not on file     Attends Yarsani service: Not on file     Active member of club or organization: Not on file     Attends meetings of clubs or organizations: Not on file     Relationship status: Not on file     Intimate partner violence:     Fear of current or ex partner: Not on file     Emotionally abused: Not on file     Physically abused: Not on file     Forced sexual activity: Not on file   Other Topics Concern     Not on file   Social History Narrative     Not on file       REVIEW OF SYSTEMS: Patient Supplied Answers to Review of Systems   ENT ROS 7/8/2019   Constitutional -   Neurology Dizzy spells   Psychology -   Ears, Nose, Throat Nasal congestion or drainage   Gastrointestinal/Genitourinary Constipation, Diarrhea   Musculoskeletal Sore or stiff joints   Endocrine -   Other Problems with anesthesia in surgery       The remainder of the 10 point ROS is negative    PHYSICIAL  EXAMINATION:  Constitutional: The patient was well-groomed and in no acute distress.   Skin: Warm and pink.  Psychiatric: The patient's affect was calm, cooperative, and appropriate.   Respiratory: Breathing comfortably without stridor or exertion of accessory muscles.  Eyes: Pupils were equal and reactive. Extraocular movement intact.   Head: Normocephalic and atraumatic. No lesions or scars.  Ears: Both ears examined.  She is placed on the microscope for cavity cleaning.  Under high-power magnification the right side was cleaned with curette.  Following cleaning, she does have a whitish area inferior aspect of the middle ear.  Looks stable.  Dr. Tijerina mentions this in his note and feels it is possibly the head of the TORP.  Stable appearance.  Tube in place and patent.  Right side has mild moisture which was cleaned with microscope and suction.  Some crusting was removed with curette and alligator forceps.  Following that the cavity looks intact and stable.  I can see the Lukas tube in place and patent inferiorly.  The implant for Baha looks clean and stable.  Nose: Sinuses were nontender. Anterior rhinoscopy revealed midline septum and absence of purulence or polyps.  Oral Cavity: Normal tongue, floor of mouth, buccal mucosa, and palate. No abnormal lymph tissue in the oropharynx.   Neck: The parotid is soft without masses. Supple with normal laryngeal and tracheal landmarks.   Lymphatic: There is no palpable lymphadenopathy or other masses in the neck.   Neurologic: Alert and oriented x 3. Cranial nerves III-XI within normal limits. Voice quality normal.  Cerebellar Function Tests:  Grossly normal    Audiogram: Audiogram today shows a left high-frequency sensorineural hearing loss above 2000 Hz.  Right side shows a maximal conductive component through all frequencies.  Excellent discrimination at 92% on the right and 100% on the left.    IMPRESSION AND PLAN:   1. Bilateral mastoid cavities: Stable, no further  treatment needed, monitor.   2. Left ear drainage: Cleaned today, stable, no treatment needed.  3. Right conductive hearing loss: Stable, continue with Baha.    Patient will follow-up in 6 months for continued monitoring and cleaning.    Thank you very much for the opportunity to participate in the care of your patient.    Rick L Nissen MD

## 2019-07-09 NOTE — PROGRESS NOTES
AUDIOLOGY REPORT    SUMMARY: Audiology visit completed. See audiogram for results.      RECOMMENDATIONS: Follow-up with ENT.    Elisabeth Kowalski, [facility]  Licensed Audiologist  [place] License #[id number]

## 2019-07-09 NOTE — PATIENT INSTRUCTIONS
1.  You were seen in the ENT Clinic today by Dr. Nissen.  If you have any questions or concerns after your appointment, please call [phone]. Press option #1 for scheduling related needs. Press option #3 for Nurse advice.    2.  Plan is to return to clinic in 6 months, or sooner with any concerns.      Cathleen Canas LPN  [facility] Ot[facility]aryng[facility]  [phone]

## 2019-08-08 ENCOUNTER — OFFICE VISIT (OUTPATIENT)
Dept: AUDIOLOGY | Facility: CLINIC | Age: 69
End: 2019-08-08
Payer: COMMERCIAL

## 2019-08-08 DIAGNOSIS — H90.6 MIXED HEARING LOSS, BILATERAL: Primary | ICD-10-CM

## 2019-08-08 NOTE — PROGRESS NOTES
AUDIOLOGY REPORT    SUBJECTIVE: Valentina Pereyra is a 68 year old female who was seen in the Audiology Clinic at the [facility] on 8/8/2019 for follow-up of her right osseointegrated implant with sound processor. Previous results have revealed mild sloping to profound mixed hearing loss for the left ear and moderately severe sloping to profound mixed hearing loss for the right ear. The patient has been seen previously in this clinic and was fit with a right [employer] Plus sound processor on 7/9/2014. Valentina reports an echo quality to sound, which occurs intermittently. She notes that she is not experiencing it today.    OBJECTIVE: The abutment was visualized and was found to be clean of any debris. A listening check revealed the sound processor sounded crisp and clear. The sound processor fit appropriately on the abutment. Low frequency gain was reduced by 3 dB.    Valentina was not interested in sending the sound processor in for an out-of-warranty repair at this time. Her device is now 5 years old, so we briefly discussed the possibility of upgrading to a new sound processor. Provided the patient with information on initiating the upgrade process.     ASSESSMENT: A follow-up appointment for right osseointegrated implant with sound processor was completed today. Changes to the sound processor were performed as outlined above. The patient is interested in potentially upgrading to a new sound processor.     PLAN: Valentina will return for follow-up as needed. We will provide any necessary information needed regarding an upgrade to a new sound processor. Please call this clinic with any questions regarding today s appointment.    Jeffrey Estes, [facility]  Licensed Audiologist  [place] #[id number]

## 2019-12-30 ENCOUNTER — DOCUMENTATION ONLY (OUTPATIENT)
Dept: CARE COORDINATION | Facility: CLINIC | Age: 69
End: 2019-12-30

## 2020-02-11 ENCOUNTER — OFFICE VISIT (OUTPATIENT)
Dept: OTOLARYNGOLOGY | Facility: CLINIC | Age: 70
End: 2020-02-11
Payer: COMMERCIAL

## 2020-02-11 VITALS
TEMPERATURE: 97.9 F | HEIGHT: 69 IN | BODY MASS INDEX: 27.99 KG/M2 | DIASTOLIC BLOOD PRESSURE: 68 MMHG | WEIGHT: 189 LBS | HEART RATE: 64 BPM | RESPIRATION RATE: 18 BRPM | SYSTOLIC BLOOD PRESSURE: 105 MMHG

## 2020-02-11 DIAGNOSIS — H91.90 HEARING LOSS, UNSPECIFIED HEARING LOSS TYPE, UNSPECIFIED LATERALITY: Primary | ICD-10-CM

## 2020-02-11 DIAGNOSIS — H90.3 BILATERAL HIGH FREQUENCY SENSORINEURAL HEARING LOSS: ICD-10-CM

## 2020-02-11 DIAGNOSIS — H61.23 EXCESSIVE CERUMEN IN BOTH EAR CANALS: ICD-10-CM

## 2020-02-11 DIAGNOSIS — H95.193 ENCOUNTER FOR MASTOIDECTOMY CAVITY DEBRIDEMENT, BILATERAL: ICD-10-CM

## 2020-02-11 DIAGNOSIS — H90.11 CONDUCTIVE HEARING LOSS OF RIGHT EAR, UNSPECIFIED HEARING STATUS ON CONTRALATERAL SIDE: ICD-10-CM

## 2020-02-11 ASSESSMENT — MIFFLIN-ST. JEOR: SCORE: 1446.29

## 2020-02-11 ASSESSMENT — PAIN SCALES - GENERAL: PAINLEVEL: NO PAIN (0)

## 2020-02-11 NOTE — NURSING NOTE
"Chief Complaint   Patient presents with     RECHECK     Right CHL     Blood pressure 105/68, pulse 64, temperature 97.9  F (36.6  C), resp. rate 18, height 1.76 m (5' 9.29\"), weight 85.7 kg (189 lb).    Jhon Sagastume LPN    "

## 2020-02-11 NOTE — LETTER
2/11/2020       RE: Valentina Pereyra  [address] [place] [id number]     Dear Colleague,    Thank you for referring your patient, Valentina Pereyra, to the [facility] EAR NOSE [facility] THROAT at [facility]. Please see a copy of my visit note below.    Dear German Quiroz:    I had the pleasure of seeing Valentina Pereyra in followup today at the [facility].    CHIEF COMPLAINT: Ears    HISTORY OF PRESENT ILLNESS: Patient is a 69-year-old in today for follow-up from her last visit in July.  She had a right Baha placed in 2014.  She had multiple surgeries in both ears and has bilateral cavities that she comes in for every 6-month cleaning.  On her follow-up today, she is not had any drainage, no pain in either ear.  Left side does have some itching a lot.  She feels her hearing has been stable, has a Baha on the right side which helps considerably.  She denies any balance concerns.  There is no dysphasia, hoarseness, facial paresthesias.    MEDICATIONS: Please refer to the detailed medication reconciliation performed by my nurse today, which I have reviewed and signed.     ALLERGIES:    Allergies   Allergen Reactions     Nkda [No Known Drug Allergies]        HABITS: Social History    Substance and Sexual Activity      Alcohol use: Yes        Comment: 2-3/wk     History   Smoking Status     Never Smoker   Smokeless Tobacco     Never Used         PAST MEDICAL HISTORY:  Please see today's intake form (for the remainder of the PMH) which I reviewed and signed.  Past Medical History:   Diagnosis Date     Anxiety attack      Arthritis      Benign positional vertigo      Conductive hearing loss      Hearing loss     chronic bilat     Noninfectious ileitis     IBS     Obstructive sleep apnea      Osteoporosis      Palpitations      PONV (postoperative nausea and vomiting)      Renal disease     frequency,urgency     Sleep apnea     does not use CPAP      Vertigo        FAMILY HISTORY/SOCIAL HISTORY:    Family History   Problem Relation Age of Onset     Family History Negative Other      Heart Disease Father      Stomach Problem Mother      Osteoporosis Mother      Cancer Brother       Social History     Socioeconomic History     Marital status:      Spouse name: Not on file     Number of children: Not on file     Years of education: Not on file     Highest education level: Not on file   Occupational History     Not on file   Social Needs     Financial resource strain: Not on file     Food insecurity:     Worry: Not on file     Inability: Not on file     Transportation needs:     Medical: Not on file     Non-medical: Not on file   Tobacco Use     Smoking status: Never Smoker     Smokeless tobacco: Never Used   Substance and Sexual Activity     Alcohol use: Yes     Comment: 2-3/wk     Drug use: No     Sexual activity: Never   Lifestyle     Physical activity:     Days per week: Not on file     Minutes per session: Not on file     Stress: Not on file   Relationships     Social connections:     Talks on phone: Not on file     Gets together: Not on file     Attends Restorationism service: Not on file     Active member of club or organization: Not on file     Attends meetings of clubs or organizations: Not on file     Relationship status: Not on file     Intimate partner violence:     Fear of current or ex partner: Not on file     Emotionally abused: Not on file     Physically abused: Not on file     Forced sexual activity: Not on file   Other Topics Concern     Not on file   Social History Narrative     Not on file       REVIEW OF SYSTEMS: Patient Supplied Answers to Review of Systems  [facility] ENT ROS 2/10/2020   Constitutional -   Neurology Headache   Psychology -   Ears, Nose, Throat Hearing loss   Gastrointestinal/Genitourinary Heartburn/indigestion, Constipation, Diarrhea   Musculoskeletal Sore or stiff joints   Endocrine -   Other -            The remainder of the 10  point ROS is negative    PHYSICIAL EXAMINATION:  Constitutional: The patient was well-groomed and in no acute distress.   Skin: Warm and pink.  Psychiatric: The patient's affect was calm, cooperative, and appropriate.   Respiratory: Breathing comfortably without stridor or exertion of accessory muscles.  Eyes: Pupils were equal and reactive. Extraocular movement intact.   Head: Normocephalic and atraumatic. No lesions or scars.  Ears: Both ears examined she does have bilateral cavities.  Under high-power magnification the right side was cleaned with curettes and alligator forceps.  After cleaning, she does have a pseudomembrane covering the mastoid cavity but it looks clean and dry today no worrisome changes.  Tympanic membrane shows no perforation, adhesive process but stable.  Left ear was cleaned examined using back scope and instruments as well.  Here again she has pseudomembrane covering mastoid with TM looks intact and no worrisome changes.  Nose: Sinuses were nontender. Anterior rhinoscopy revealed midline septum and absence of purulence or polyps.  Oral Cavity: Normal tongue, floor of mouth, buccal mucosa, and palate. No abnormal lymph tissue in the oropharynx.   Neck: The parotid is soft without masses. Supple with normal laryngeal and tracheal landmarks.   Lymphatic: There is no palpable lymphadenopathy or other masses in the neck.   Neurologic: Alert and oriented x 3. Cranial nerves III-XI within normal limits. Voice quality normal.  Cerebellar Function Tests:  Grossly normal    Audiogram: No audiogram today, previous audiogram reviewed with her.  Subjectively she feels stable.    IMPRESSION AND PLAN:   1. Bilateral mastoid cavities: Cleaned today of excessive cerumen.  No further treatment needed, she will come in in 6 months to year for regular cleaning, sooner with any concerns or problems.  2. Bilateral high-frequency sensorineural hearing loss: Subjectively stable, recommend continue to  monitor.  3. Right conductive hearing loss: Continue to maximize benefit from the Baha implant.  4. Excessive cerumen: Cleaned today, continue to monitor cavities and cleaning regularly.    Thank you very much for the opportunity to participate in the care of your patient.    Rick L Nissen MD

## 2020-02-11 NOTE — LETTER
Hearing Aid Medical Clearance    Valentina Pereyra  February 11, 2020        This patient has received a medical examination and may be considered a suitable candidate for a hearing aid.         Physician:__________________________________________________

## 2020-02-11 NOTE — PROGRESS NOTES
Dear German Quiroz:    I had the pleasure of seeing Valentina Pereyra in followup today at the [facility].    CHIEF COMPLAINT: Ears    HISTORY OF PRESENT ILLNESS: Patient is a 69-year-old in today for follow-up from her last visit in July.  She had a right Baha placed in 2014.  She had multiple surgeries in both ears and has bilateral cavities that she comes in for every 6-month cleaning.  On her follow-up today, she is not had any drainage, no pain in either ear.  Left side does have some itching a lot.  She feels her hearing has been stable, has a Baha on the right side which helps considerably.  She denies any balance concerns.  There is no dysphasia, hoarseness, facial paresthesias.    MEDICATIONS: Please refer to the detailed medication reconciliation performed by my nurse today, which I have reviewed and signed.     ALLERGIES:    Allergies   Allergen Reactions     Nkda [No Known Drug Allergies]        HABITS: Social History    Substance and Sexual Activity      Alcohol use: Yes        Comment: 2-3/wk     History   Smoking Status     Never Smoker   Smokeless Tobacco     Never Used         PAST MEDICAL HISTORY:  Please see today's intake form (for the remainder of the PMH) which I reviewed and signed.  Past Medical History:   Diagnosis Date     Anxiety attack      Arthritis      Benign positional vertigo      Conductive hearing loss      Hearing loss     chronic bilat     Noninfectious ileitis     IBS     Obstructive sleep apnea      Osteoporosis      Palpitations      PONV (postoperative nausea and vomiting)      Renal disease     frequency,urgency     Sleep apnea     does not use CPAP     Vertigo        FAMILY HISTORY/SOCIAL HISTORY:    Family History   Problem Relation Age of Onset     Family History Negative Other      Heart Disease Father      Stomach Problem Mother      Osteoporosis Mother      Cancer Brother       Social History     Socioeconomic History     Marital  status:      Spouse name: Not on file     Number of children: Not on file     Years of education: Not on file     Highest education level: Not on file   Occupational History     Not on file   Social Needs     Financial resource strain: Not on file     Food insecurity:     Worry: Not on file     Inability: Not on file     Transportation needs:     Medical: Not on file     Non-medical: Not on file   Tobacco Use     Smoking status: Never Smoker     Smokeless tobacco: Never Used   Substance and Sexual Activity     Alcohol use: Yes     Comment: 2-3/wk     Drug use: No     Sexual activity: Never   Lifestyle     Physical activity:     Days per week: Not on file     Minutes per session: Not on file     Stress: Not on file   Relationships     Social connections:     Talks on phone: Not on file     Gets together: Not on file     Attends Sabianism service: Not on file     Active member of club or organization: Not on file     Attends meetings of clubs or organizations: Not on file     Relationship status: Not on file     Intimate partner violence:     Fear of current or ex partner: Not on file     Emotionally abused: Not on file     Physically abused: Not on file     Forced sexual activity: Not on file   Other Topics Concern     Not on file   Social History Narrative     Not on file       REVIEW OF SYSTEMS: Patient Supplied Answers to Review of Systems   ENT ROS 2/10/2020   Constitutional -   Neurology Headache   Psychology -   Ears, Nose, Throat Hearing loss   Gastrointestinal/Genitourinary Heartburn/indigestion, Constipation, Diarrhea   Musculoskeletal Sore or stiff joints   Endocrine -   Other -            The remainder of the 10 point ROS is negative    PHYSICIAL EXAMINATION:  Constitutional: The patient was well-groomed and in no acute distress.   Skin: Warm and pink.  Psychiatric: The patient's affect was calm, cooperative, and appropriate.   Respiratory: Breathing comfortably without stridor or exertion of  accessory muscles.  Eyes: Pupils were equal and reactive. Extraocular movement intact.   Head: Normocephalic and atraumatic. No lesions or scars.  Ears: Both ears examined she does have bilateral cavities.  Under high-power magnification the right side was cleaned with curettes and alligator forceps.  After cleaning, she does have a pseudomembrane covering the mastoid cavity but it looks clean and dry today no worrisome changes.  Tympanic membrane shows no perforation, adhesive process but stable.  Left ear was cleaned examined using back scope and instruments as well.  Here again she has pseudomembrane covering mastoid with TM looks intact and no worrisome changes.  Nose: Sinuses were nontender. Anterior rhinoscopy revealed midline septum and absence of purulence or polyps.  Oral Cavity: Normal tongue, floor of mouth, buccal mucosa, and palate. No abnormal lymph tissue in the oropharynx.   Neck: The parotid is soft without masses. Supple with normal laryngeal and tracheal landmarks.   Lymphatic: There is no palpable lymphadenopathy or other masses in the neck.   Neurologic: Alert and oriented x 3. Cranial nerves III-XI within normal limits. Voice quality normal.  Cerebellar Function Tests:  Grossly normal    Audiogram: No audiogram today, previous audiogram reviewed with her.  Subjectively she feels stable.    IMPRESSION AND PLAN:   1. Bilateral mastoid cavities: Cleaned today of excessive cerumen.  No further treatment needed, she will come in in 6 months to year for regular cleaning, sooner with any concerns or problems.  2. Bilateral high-frequency sensorineural hearing loss: Subjectively stable, recommend continue to monitor.  3. Right conductive hearing loss: Continue to maximize benefit from the Baha implant.  4. Excessive cerumen: Cleaned today, continue to monitor cavities and cleaning regularly.    Thank you very much for the opportunity to participate in the care of your patient.    Rick L Nissen  MD

## 2020-03-02 ENCOUNTER — HEALTH MAINTENANCE LETTER (OUTPATIENT)
Age: 70
End: 2020-03-02

## 2020-09-18 ENCOUNTER — TELEPHONE (OUTPATIENT)
Dept: OTOLARYNGOLOGY | Facility: CLINIC | Age: 70
End: 2020-09-18

## 2020-09-18 NOTE — TELEPHONE ENCOUNTER
Spoke with patient regarding device not working. Discussed option for sending device in for repair or starting the process for an upgrade - patient interested in obtaining upgrade. Patient scheduled accordingly. A loaner device will be fit at that appointment as well.      Andreina Kimball  Audiologist  [place] License  #[id number]

## 2020-09-18 NOTE — TELEPHONE ENCOUNTER
[facility] Call Center    Phone Message    May a detailed message be left on voicemail: yes     Reason for Call: Other: Pt called in because her BAHA stopped working yesterday 9/17 and she cannot hear from it. TE per protocols, please call Pt back to discuss/schedule. Thank you    Action Taken: Message routed to:  [facility] ([facility] Audiology    Travel Screening: Not Applicable

## 2020-09-28 ENCOUNTER — OFFICE VISIT (OUTPATIENT)
Dept: AUDIOLOGY | Facility: CLINIC | Age: 70
End: 2020-09-28
Payer: COMMERCIAL

## 2020-09-28 DIAGNOSIS — H90.6 MIXED HEARING LOSS, BILATERAL: Primary | ICD-10-CM

## 2020-09-28 NOTE — PROGRESS NOTES
AUDIOLOGY REPORT    SUBJECTIVE:  Valentina Pereyra is a 69 year old female who was seen in the Audiology Clinic at the [facility] for audiologic evaluation, referred by Rick Nissen, M.D.. Previous results have revealed mild sloping to profound mixed hearing loss for the left ear and moderately severe sloping to profound mixed hearing loss for the right ear. The patient has been seen previously in this clinic and was fit with a right [employer] Ponto Plus sound processor on 7/9/2014. The patient denies otologic symptoms including ear pain, drainage, tinnitus and dizziness.  The patient notes difficulty with communication in a variety of listening situations.     OBJECTIVE:  Otoscopic exam indicates tube in the left ear and clear ear canal right ear.    Pure Tone Thresholds assessed using conventional audiometry with good  reliability from 250-8000 Hz bilaterally using insert earphones and circumaural headphones     RIGHT:  Severe to profound mixed hearing loss    LEFT:    Mild sloping to profound mixed hearing loss     Tympanogram:    RIGHT: Did not test due to surgical history    LEFT:   Did not test due to surgical history    Reflexes (reported by stimulus ear):     Did not test due to surgical history    Speech Reception Threshold:    RIGHT: 60 dB HL    LEFT:   35 dB HL    Word Recognition Score:     RIGHT: 88% at 95 dB HL using NU-6 recorded word list.    LEFT:   100% at 75 dB HL using NU-6 recorded word list.    The patient reports that her current [employer] Ponto Plus osseointegrated device is not working. She was given the option of sending it to the  for an out of warranty repair (which she would likely need to pay out of pocket for), starting the upgrade process, or getting it repaired and starting the upgrade process. She was only interested in starting the upgrade process. She will be given an [employer] Ponto 3 SP device as a loaner until the new device  "is received.     Visual inspections revealed that the abutment site looked healthy with no redness, swelling, or drainage noted. A shake test was performed and showed good osseointegration, the screw was also found to be tight.    Valentina was presented with options from both manufacturers ([employer] and [employer]). She has an iPhone and was interested in connectivity \"if it was easy\".  She decided that she would like to stay with [employer] and pursue the Ponto 4. She was provided instructions for how to initiate the upgrade process. It was reviewed that she will contact the  and they will work directly with her insurance to obtain the upgrade. She expressed understanding.    She expressed interest in a left hearing aid. It was reviewed that because she has had multiple left ear surgeries, medical clearance from a physician would be required. She expressed understanding and stated that she had previously received medical clearance for a left hearing aid, however that information could not be found in her chart. The process of obtaining a hearing aid was reviewed and it was offered that she could set up appointments today before she left, however she has decided to not pursue a left hearing aid until after her next appointment with Dr. Nissen which is scheduled in February 2021 (a sooner appointment was offered but she declined).  She was encouraged to contact her insurance to explore her hearing aid benefits. She expressed understanding.    ASSESSMENT:     Compared to patient's previous audiogram dated 07/09/2019, hearing has decreased by 10 dB at 250 and 2000 Hz, right and by 10-20 dB at 2000 and 3000 Hz, left. Today s results were discussed with the patient in detail.     Patient was fit with a [employer] Ponto 3 SP device today to use until she obtains an upgrade. She was provided instructions for beginning the upgrade process.    PLAN:  Patient was counseled regarding hearing loss " and impact on communication.  Patient continues to be a good candidate for a right osseointegrated hearing device. The patient will contact the  to begin the upgrade process. It is recommended that the patient follow up with ENT regarding surgical ear and medical clearance for left amplification.  Please call this clinic with questions regarding these results or recommendations.      ANTOINE Ruby.   Audiology Doctoral Extern, License #[id number]        I was present with the patient for the entire Audiology appointment including all procedures/testing performed by the AuD student, and agree with the student s assessment and plan as documented.      Sonja Weller, Andreina  Audiologist  [place] License  #[id number]

## 2021-02-05 NOTE — PATIENT INSTRUCTIONS
1. You were seen in the ENT Clinic today by Dr. Nissen.  If you have any questions or concerns after your appointment, please call   - Option 1: ENT Clinic: [phone]   - Option 2: Danii (Dr. Nissen's Nurse): [phone]    2.   Plan to return to clinic in 1 year with hearing test    Danii Bazzi LPN  [facility] - Otolaryngology

## 2021-02-09 ENCOUNTER — OFFICE VISIT (OUTPATIENT)
Dept: OTOLARYNGOLOGY | Facility: CLINIC | Age: 71
End: 2021-02-09
Payer: COMMERCIAL

## 2021-02-09 VITALS
SYSTOLIC BLOOD PRESSURE: 98 MMHG | HEART RATE: 61 BPM | OXYGEN SATURATION: 95 % | TEMPERATURE: 97.7 F | DIASTOLIC BLOOD PRESSURE: 64 MMHG

## 2021-02-09 DIAGNOSIS — H93.13 TINNITUS, BILATERAL: ICD-10-CM

## 2021-02-09 DIAGNOSIS — H95.193 ENCOUNTER FOR MASTOIDECTOMY CAVITY DEBRIDEMENT, BILATERAL: ICD-10-CM

## 2021-02-09 DIAGNOSIS — H90.11 CONDUCTIVE HEARING LOSS OF RIGHT EAR, UNSPECIFIED HEARING STATUS ON CONTRALATERAL SIDE: ICD-10-CM

## 2021-02-09 DIAGNOSIS — H91.90 HEARING LOSS, UNSPECIFIED HEARING LOSS TYPE, UNSPECIFIED LATERALITY: Primary | ICD-10-CM

## 2021-02-09 DIAGNOSIS — H90.5 HIGH FREQUENCY SENSORINEURAL HEARING LOSS OF LEFT EAR: ICD-10-CM

## 2021-02-09 PROCEDURE — 99214 OFFICE O/P EST MOD 30 MIN: CPT | Mod: 25 | Performed by: OTOLARYNGOLOGY

## 2021-02-09 PROCEDURE — 69220 CLEAN OUT MASTOID CAVITY: CPT | Mod: 50 | Performed by: OTOLARYNGOLOGY

## 2021-02-09 ASSESSMENT — PAIN SCALES - GENERAL: PAINLEVEL: NO PAIN (0)

## 2021-02-09 NOTE — NURSING NOTE
Chief Complaint   Patient presents with     RECHECK     follow up      Blood pressure 98/64, pulse 61, temperature 97.7  F (36.5  C), SpO2 95 %.    Jhon Sagastume LPN

## 2021-02-09 NOTE — PROGRESS NOTES
Dear German Quiroz:    I had the pleasure of seeing Valentina Pereyra in followup today at the [facility].    CHIEF COMPLAINT: Bilateral mastoid cavity, bilateral hearing loss    HISTORY OF PRESENT ILLNESS: Patient is a 70-year-old in today for 1 year follow-up.  She has a right Baha which was placed in 2014.  She has had multiple surgeries in both ears with bilateral cavities.  She usually comes in for 6-month cleaning, secondary to Covid it has been 1 year.  She is a longtime patient of Dr. Olvera.  She is not had any recent pain or drainage.  She feels her hearing has been stable.  There is been no dizziness.  Does have bilateral tinnitus but not problematic.  Denies any dysphagia, hoarseness, facial paresthesias.    MEDICATIONS: Please refer to the detailed medication reconciliation performed by my nurse today, which I have reviewed and signed.     ALLERGIES:    Allergies   Allergen Reactions     Nkda [No Known Drug Allergies]        HABITS: Social History    Substance and Sexual Activity      Alcohol use: Yes        Comment: 2-3/wk     History   Smoking Status     Never Smoker   Smokeless Tobacco     Never Used         PAST MEDICAL HISTORY:  Please see today's intake form (for the remainder of the PMH) which I reviewed and signed.  Past Medical History:   Diagnosis Date     Anxiety attack      Arthritis      Benign positional vertigo      Conductive hearing loss      Hearing loss     chronic bilat     Noninfectious ileitis     IBS     Obstructive sleep apnea      Osteoporosis      Palpitations      PONV (postoperative nausea and vomiting)      Renal disease     frequency,urgency     Sleep apnea     does not use CPAP     Vertigo        FAMILY HISTORY/SOCIAL HISTORY:    Family History   Problem Relation Age of Onset     Family History Negative Other      Heart Disease Father      Stomach Problem Mother      Osteoporosis Mother      Cancer Brother       Social History      Socioeconomic History     Marital status:      Spouse name: Not on file     Number of children: Not on file     Years of education: Not on file     Highest education level: Not on file   Occupational History     Not on file   Social Needs     Financial resource strain: Not on file     Food insecurity     Worry: Not on file     Inability: Not on file     Transportation needs     Medical: Not on file     Non-medical: Not on file   Tobacco Use     Smoking status: Never Smoker     Smokeless tobacco: Never Used   Substance and Sexual Activity     Alcohol use: Yes     Comment: 2-3/wk     Drug use: No     Sexual activity: Never   Lifestyle     Physical activity     Days per week: Not on file     Minutes per session: Not on file     Stress: Not on file   Relationships     Social connections     Talks on phone: Not on file     Gets together: Not on file     Attends Amish service: Not on file     Active member of club or organization: Not on file     Attends meetings of clubs or organizations: Not on file     Relationship status: Not on file     Intimate partner violence     Fear of current or ex partner: Not on file     Emotionally abused: Not on file     Physically abused: Not on file     Forced sexual activity: Not on file   Other Topics Concern     Not on file   Social History Narrative     Not on file       REVIEW OF SYSTEMS: [unfilled]    The remainder of the 10 point ROS is negative    PHYSICIAL EXAMINATION:  Constitutional: The patient was well-groomed and in no acute distress.   Skin: Warm and pink.  Psychiatric: The patient's affect was calm, cooperative, and appropriate.   Respiratory: Breathing comfortably without stridor or exertion of accessory muscles.  Eyes: Pupils were equal and reactive. Extraocular movement intact.   Head: Normocephalic and atraumatic. No lesions or scars.  Ears: Both ears examined and she does have significant debris specially in the right ear.  She is taken to the microscope  and under high-power magnification the right side was cleaned with instruments including suction, right angled hook, alligator forcep and curettes.  Following cleaning, mastoid cavity is visualized and is showing some membranous closure.  I see no worrisome changes or active drainage.  Middle ear shows apparent tympanic membrane area to be intact with no worrisome changes.  Baha site looks to be well-healed and stable.  Left ear was cleaned with microscope and similar instruments.  Mastoid cavity is constricted but again no worrisome changes.  She has a left T-tube to be in place with some wax around the base, this was left in place as I could not remove it without fear of losing the tube.  Nose: Sinuses were nontender. Anterior rhinoscopy revealed midline septum and absence of purulence or polyps.  Oral Cavity: Normal tongue, floor of mouth, buccal mucosa, and palate. No abnormal lymph tissue in the oropharynx.   Neck: The parotid is soft without masses. Supple with normal laryngeal and tracheal landmarks.   Lymphatic: There is no palpable lymphadenopathy or other masses in the neck.   Neurologic: Alert and oriented x 3. Cranial nerves III-XI within normal limits. Voice quality normal.  Cerebellar Function Tests:  Grossly normal    Audiogram: Last audiogram from September reviewed with her showing a maximal conductive loss in the right ear and a high-frequency left sensorineural hearing loss.  Good discrimination in both ears.    IMPRESSION AND PLAN:   1. Bilateral mastoid cavity cleaning: Both cleaned today, stable appearance.  No further treatment needed, monitor.  2. Right maximal conductive hearing loss: Continue to maximize benefit from Baha implant.  3. Left high-frequency sensorineural hearing loss: Discussed option for hearing aid.  She will pursue this at her discretion in place of location.  4. Bilateral tinnitus: No treatment needed, monitor.    Thank you very much for the opportunity to participate in  the care of your patient.    Rick L Nissen MD

## 2021-02-09 NOTE — LETTER
2/9/2021       RE: Valentina Pereyra  [address] [place] [address]     Dear Colleague,    Thank you for referring your patient, Valentina Pereyra, to the [facility] EAR NOSE AND THROAT CLINIC [place] at [facility]. Please see a copy of my visit note below.    Dear German Quiroz:    I had the pleasure of seeing Valentina Pereyra in followup today at the [facility] Otolaryngology [facility].    CHIEF COMPLAINT: Bilateral mastoid cavity, bilateral hearing loss    HISTORY OF PRESENT ILLNESS: Patient is a 70-year-old in today for 1 year follow-up.  She has a right Baha which was placed in 2014.  She has had multiple surgeries in both ears with bilateral cavities.  She usually comes in for 6-month cleaning, secondary to Covid it has been 1 year.  She is a longtime patient of Dr. Olvera.  She is not had any recent pain or drainage.  She feels her hearing has been stable.  There is been no dizziness.  Does have bilateral tinnitus but not problematic.  Denies any dysphagia, hoarseness, facial paresthesias.    MEDICATIONS: Please refer to the detailed medication reconciliation performed by my nurse today, which I have reviewed and signed.     ALLERGIES:    Allergies   Allergen Reactions     Nkda [No Known Drug Allergies]        HABITS: Social History    Substance and Sexual Activity      Alcohol use: Yes        Comment: 2-3/wk     History   Smoking Status     Never Smoker   Smokeless Tobacco     Never Used         PAST MEDICAL HISTORY:  Please see today's intake form (for the remainder of the PMH) which I reviewed and signed.  Past Medical History:   Diagnosis Date     Anxiety attack      Arthritis      Benign positional vertigo      Conductive hearing loss      Hearing loss     chronic bilat     Noninfectious ileitis     IBS     Obstructive sleep apnea      Osteoporosis      Palpitations      PONV (postoperative nausea and vomiting)      Renal disease      frequency,urgency     Sleep apnea     does not use CPAP     Vertigo        FAMILY HISTORY/SOCIAL HISTORY:    Family History   Problem Relation Age of Onset     Family History Negative Other      Heart Disease Father      Stomach Problem Mother      Osteoporosis Mother      Cancer Brother       Social History     Socioeconomic History     Marital status:      Spouse name: Not on file     Number of children: Not on file     Years of education: Not on file     Highest education level: Not on file   Occupational History     Not on file   Social Needs     Financial resource strain: Not on file     Food insecurity     Worry: Not on file     Inability: Not on file     Transportation needs     Medical: Not on file     Non-medical: Not on file   Tobacco Use     Smoking status: Never Smoker     Smokeless tobacco: Never Used   Substance and Sexual Activity     Alcohol use: Yes     Comment: 2-3/wk     Drug use: No     Sexual activity: Never   Lifestyle     Physical activity     Days per week: Not on file     Minutes per session: Not on file     Stress: Not on file   Relationships     Social connections     Talks on phone: Not on file     Gets together: Not on file     Attends Taoist service: Not on file     Active member of club or organization: Not on file     Attends meetings of clubs or organizations: Not on file     Relationship status: Not on file     Intimate partner violence     Fear of current or ex partner: Not on file     Emotionally abused: Not on file     Physically abused: Not on file     Forced sexual activity: Not on file   Other Topics Concern     Not on file   Social History Narrative     Not on file       REVIEW OF SYSTEMS: [unfilled]    The remainder of the 10 point ROS is negative    PHYSICIAL EXAMINATION:  Constitutional: The patient was well-groomed and in no acute distress.   Skin: Warm and pink.  Psychiatric: The patient's affect was calm, cooperative, and appropriate.   Respiratory: Breathing  comfortably without stridor or exertion of accessory muscles.  Eyes: Pupils were equal and reactive. Extraocular movement intact.   Head: Normocephalic and atraumatic. No lesions or scars.  Ears: Both ears examined and she does have significant debris specially in the right ear.  She is taken to the microscope and under high-power magnification the right side was cleaned with instruments including suction, right angled hook, alligator forcep and curettes.  Following cleaning, mastoid cavity is visualized and is showing some membranous closure.  I see no worrisome changes or active drainage.  Middle ear shows apparent tympanic membrane area to be intact with no worrisome changes.  Baha site looks to be well-healed and stable.  Left ear was cleaned with microscope and similar instruments.  Mastoid cavity is constricted but again no worrisome changes.  She has a left T-tube to be in place with some wax around the base, this was left in place as I could not remove it without fear of losing the tube.  Nose: Sinuses were nontender. Anterior rhinoscopy revealed midline septum and absence of purulence or polyps.  Oral Cavity: Normal tongue, floor of mouth, buccal mucosa, and palate. No abnormal lymph tissue in the oropharynx.   Neck: The parotid is soft without masses. Supple with normal laryngeal and tracheal landmarks.   Lymphatic: There is no palpable lymphadenopathy or other masses in the neck.   Neurologic: Alert and oriented x 3. Cranial nerves III-XI within normal limits. Voice quality normal.  Cerebellar Function Tests:  Grossly normal    Audiogram: Last audiogram from September reviewed with her showing a maximal conductive loss in the right ear and a high-frequency left sensorineural hearing loss.  Good discrimination in both ears.    IMPRESSION AND PLAN:   1. Bilateral mastoid cavity cleaning: Both cleaned today, stable appearance.  No further treatment needed, monitor.  2. Right maximal conductive hearing loss:  Continue to maximize benefit from Baha implant.  3. Left high-frequency sensorineural hearing loss: Discussed option for hearing aid.  She will pursue this at her discretion in place of location.  4. Bilateral tinnitus: No treatment needed, monitor.    Thank you very much for the opportunity to participate in the care of your patient.    Rick L Nissen MD          \

## 2021-02-15 ENCOUNTER — TELEPHONE (OUTPATIENT)
Dept: AUDIOLOGY | Facility: CLINIC | Age: 71
End: 2021-02-15

## 2021-02-15 NOTE — TELEPHONE ENCOUNTER
[facility] Ponto 4 [facility].      Jeffrey Klein, [facility]  Licensed Audiologist  [place] #[id number]

## 2021-04-18 ENCOUNTER — HEALTH MAINTENANCE LETTER (OUTPATIENT)
Age: 71
End: 2021-04-18

## 2021-05-08 ENCOUNTER — NURSE TRIAGE (OUTPATIENT)
Dept: NURSING | Facility: CLINIC | Age: 71
End: 2021-05-08

## 2021-05-08 NOTE — TELEPHONE ENCOUNTER
Patient calling reporting symptoms of UTI starting today. Urgency, frequency, dysuria. Afebrile. Reporting history of UTI symptoms 2 weeks ago. Patient completed antibiotics and symptoms returned.    Disposition to see provider with in 24 hours.    Patient will go to [facility] 5/9/21.    Reviewed to call back with any increase or change in symptoms.    Debbie Alejandro RN  [facility]        Reason for Disposition    Age > 50 years    Additional Information    Negative: Shock suspected (e.g., cold/pale/clammy skin, too weak to stand, low BP, rapid pulse)    Negative: Sounds like a life-threatening emergency to the triager    Negative: Followed a genital area injury    Negative: Taking antibiotic for urinary tract infection (UTI)    Negative: Pregnant    Negative: Postpartum (from 0 to 6 weeks after delivery)    Negative: [1] Unable to urinate (or only a few drops) > 4 hours AND [2] bladder feels very full (e.g., palpable bladder or strong urge to urinate)    Negative: Vomiting    Negative: Patient sounds very sick or weak to the triager    Negative: [1] SEVERE pain with urination  (e.g., excruciating) AND [2] not improved after 2 hours of pain medicine and Sitz bath    Negative: Fever > 100.4 F (38.0 C)    Negative: Side (flank) or lower back pain present    Negative: Diabetes mellitus or weak immune system (e.g., HIV positive, cancer chemo, splenectomy, organ transplant, chronic steroids)    Negative: Bedridden (e.g., nursing home patient, CVA, chronic illness, recovering from surgery)    Negative: Artificial heart valve or artificial joint    Negative: Unusual vaginal discharge (e.g., bad smelling, yellow, green, or foamy-white)    Negative: Patient is worried about sexually transmitted disease (STD)    Negative: Possibility of pregnancy    Negative: Blood in urine (red, pink, or tea-colored)    Protocols used: URINATION PAIN - FEMALE-A-

## 2021-10-02 ENCOUNTER — HEALTH MAINTENANCE LETTER (OUTPATIENT)
Age: 71
End: 2021-10-02

## 2022-01-21 DIAGNOSIS — H91.90 HEARING LOSS, UNSPECIFIED HEARING LOSS TYPE, UNSPECIFIED LATERALITY: Primary | ICD-10-CM

## 2022-02-14 NOTE — PATIENT INSTRUCTIONS
1. You were seen in the ENT Clinic today by Dr. Nissen.  If you have any questions or concerns after your appointment, please call   - Option 1: ENT Clinic: [phone]   - Option 2: Danii (Dr. Nissen's Nurse): [phone]                   Padmaja(Dr. Nissen's Nurse): [phone]      2.   Plan to return to clinic in 1 year with hearing test  Danii Bazzi LPN  [facility] - Otolaryngology

## 2022-02-15 ENCOUNTER — OFFICE VISIT (OUTPATIENT)
Dept: OTOLARYNGOLOGY | Facility: CLINIC | Age: 72
End: 2022-02-15
Payer: COMMERCIAL

## 2022-02-15 ENCOUNTER — OFFICE VISIT (OUTPATIENT)
Dept: AUDIOLOGY | Facility: CLINIC | Age: 72
End: 2022-02-15
Attending: OTOLARYNGOLOGY
Payer: COMMERCIAL

## 2022-02-15 VITALS
WEIGHT: 195 LBS | BODY MASS INDEX: 27.92 KG/M2 | SYSTOLIC BLOOD PRESSURE: 105 MMHG | HEART RATE: 64 BPM | DIASTOLIC BLOOD PRESSURE: 66 MMHG | TEMPERATURE: 97.6 F | HEIGHT: 70 IN | OXYGEN SATURATION: 97 %

## 2022-02-15 DIAGNOSIS — H95.193 ENCOUNTER FOR DEBRIDEMENT OF BILATERAL POSTMASTOIDECTOMY CAVITIES: ICD-10-CM

## 2022-02-15 DIAGNOSIS — H91.90 HEARING LOSS, UNSPECIFIED HEARING LOSS TYPE, UNSPECIFIED LATERALITY: Primary | ICD-10-CM

## 2022-02-15 DIAGNOSIS — H90.6 MIXED HEARING LOSS, BILATERAL: Primary | ICD-10-CM

## 2022-02-15 DIAGNOSIS — H91.90 HEARING LOSS, UNSPECIFIED HEARING LOSS TYPE, UNSPECIFIED LATERALITY: ICD-10-CM

## 2022-02-15 DIAGNOSIS — H90.0 CONDUCTIVE HEARING LOSS, BILATERAL: ICD-10-CM

## 2022-02-15 PROCEDURE — 69220 CLEAN OUT MASTOID CAVITY: CPT | Mod: 50 | Performed by: OTOLARYNGOLOGY

## 2022-02-15 PROCEDURE — 92557 COMPREHENSIVE HEARING TEST: CPT | Performed by: AUDIOLOGIST

## 2022-02-15 RX ORDER — SERTRALINE HYDROCHLORIDE 100 MG/1
TABLET, FILM COATED ORAL
COMMUNITY
Start: 2021-11-10 | End: 2022-12-20

## 2022-02-15 RX ORDER — ATORVASTATIN CALCIUM 20 MG/1
TABLET, FILM COATED ORAL
COMMUNITY
Start: 2021-09-28 | End: 2022-12-20

## 2022-02-15 RX ORDER — ATORVASTATIN CALCIUM 10 MG/1
TABLET, FILM COATED ORAL
COMMUNITY
Start: 2021-10-06 | End: 2022-12-20

## 2022-02-15 ASSESSMENT — PAIN SCALES - GENERAL: PAINLEVEL: NO PAIN (1)

## 2022-02-15 ASSESSMENT — MIFFLIN-ST. JEOR: SCORE: 1466.82

## 2022-02-15 NOTE — PROGRESS NOTES
Dear German Quiroz:    I had the pleasure of seeing Valentina Pereyra in followup today at the [facility] Otolaryngology Clinic.    CHIEF COMPLAINT: Encounter for mastoid cavity debridement    HISTORY OF PRESENT ILLNESS: Patient is a 71-year-old in today for follow-up from her last visit of 2/9/2021.  She has bilateral mastoid cavities and comes in for yearly cleaning.  She is a longtime patient of Dr. Tijerina's in the past.  She also had a right Baha placement in 2014.  On her follow-up today, she has not had any recent pain or drainage from ears.  She feels her hearing is stable as far she can tell.  She does hear better in the left ear compared to the right without the Baha.  Baha is quite helpful for her.  She has not had any dizziness.  Does have bilateral tinnitus but not problematic.  Denies any dysphagia, hoarseness, facial paresthesias.    MEDICATIONS: Please refer to the detailed medication reconciliation performed by my nurse today, which I have reviewed and signed.     ALLERGIES:    Allergies   Allergen Reactions     Nkda [No Known Drug Allergies]        HABITS: Social History    Substance and Sexual Activity      Alcohol use: Yes        Comment: 2-3/wk     History   Smoking Status     Never Smoker   Smokeless Tobacco     Never Used         PAST MEDICAL HISTORY:  Please see today's intake form (for the remainder of the PMH) which I reviewed and signed.  Past Medical History:   Diagnosis Date     Anxiety attack      Arthritis      Benign positional vertigo      Conductive hearing loss      Hearing loss     chronic bilat     Noninfectious ileitis     IBS     Obstructive sleep apnea      Osteoporosis      Palpitations      PONV (postoperative nausea and vomiting)      Renal disease     frequency,urgency     Sleep apnea     does not use CPAP     Vertigo        FAMILY HISTORY/SOCIAL HISTORY:    Family History   Problem Relation Age of Onset     Family History Negative Other      Heart Disease  Father      Stomach Problem Mother      Osteoporosis Mother      Cancer Brother       Social History     Socioeconomic History     Marital status:      Spouse name: Not on file     Number of children: Not on file     Years of education: Not on file     Highest education level: Not on file   Occupational History     Not on file   Tobacco Use     Smoking status: Never Smoker     Smokeless tobacco: Never Used   Substance and Sexual Activity     Alcohol use: Yes     Comment: 2-3/wk     Drug use: No     Sexual activity: Never   Other Topics Concern     Not on file   Social History Narrative     Not on file     Social Determinants of Health     Financial Resource Strain: Not on file   Food Insecurity: Not on file   Transportation Needs: Not on file   Physical Activity: Not on file   Stress: Not on file   Social Connections: Not on file   Intimate Partner Violence: Not on file   Housing Stability: Not on file       REVIEW OF SYSTEMS: Patient Supplied Answers to Review of Systems   ENT ROS 2/13/2022   Constitutional -   Neurology Headache   Psychology Frequently feeling anxious   Ears, Nose, Throat Hearing loss, Ear pain, Nasal congestion or drainage   Gastrointestinal/Genitourinary -   Musculoskeletal Neck pain   Endocrine -   Other -            The remainder of the 10 point ROS is negative    PHYSICIAL EXAMINATION:  Constitutional: The patient was well-groomed and in no acute distress.   Skin: Warm and pink.  Psychiatric: The patient's affect was calm, cooperative, and appropriate.   Respiratory: Breathing comfortably without stridor or exertion of accessory muscles.  Eyes: Pupils were equal and reactive. Extraocular movement intact.   Head: Normocephalic and atraumatic. No lesions or scars.  Ears: Patient bilateral cavities with cerumen present.  She is placed on the microscope and under high-power magnification the right side initially was cleaned with instruments including curette, right angle hook, alligator  forceps.  Following cleaning, mastoid cavity well visualized to lay open meatus.  Tympanic membrane looks to be intact over middle ear with no worrisome changes or evidence for recurrent cholesteatoma.  Baha area looks well-healed and stable.  The left side was examined cleaned and shows again significant debris cleaned with curette alligator right angle hook similar technique.  She does have a tube present here, unable to clean all the cerumen from around the tube but it is open and patent.  Mastoid cavity partially obscured by a pseudomembrane, no evidence for cholesteatoma active drainage or worrisome changes.  Nose: Sinuses were nontender. Anterior rhinoscopy revealed midline septum and absence of purulence or polyps.  Oral Cavity: Normal tongue, floor of mouth, buccal mucosa, and palate. No abnormal lymph tissue in the oropharynx.   Neck: The parotid is soft without masses. Supple with normal laryngeal and tracheal landmarks.   Lymphatic: There is no palpable lymphadenopathy or other masses in the neck.   Neurologic: Alert and oriented x 3. Cranial nerves III-XI within normal limits. Voice quality normal.  Cerebellar Function Tests:  Grossly normal    Audiogram: Audiogram today shows a bilateral conductive hearing loss, right worse than left.  Excellent discrimination at 92% on the right and 96% on the left.  Fairly stable from last test done 2 years ago and reviewed with patient.    IMPRESSION AND PLAN:   1. Bilateral encounter for mastoid cavity debridement: Cleaned today with microscope and instruments as above.  Stable appearance, no further treatment needed, monitor.  2. Bilateral conductive hearing loss: Patient functioning well with Baha on the right.  Discussed with her she could trial just a regular hearing aid on the left with bigger mold.  Discussed potential problem with drainage issues but she would just need to trial left if she is interested.  She will consider that and we will pursue that at her  discretion.  She did ask about hearing options today.    Thank you very much for the opportunity to participate in the care of your patient.    Rick L Nissen MD

## 2022-02-15 NOTE — PROGRESS NOTES
AUDIOLOGY REPORT    SUMMARY: Audiology visit completed. See audiogram for results.      RECOMMENDATIONS: Follow-up with ENT.    Elisabeth Kowalski, [facility]  Licensed Audiologist  [place] License #[id number]

## 2022-02-15 NOTE — LETTER
2/15/2022       RE: Valentina Pereyra  [address] [place] [address]     Dear Colleague,    Thank you for referring your patient, Valentina Pereyra, to the [facility] EAR NOSE AND THROAT CLINIC [place] at [facility]. Please see a copy of my visit note below.    Dear German Quiroz:    I had the pleasure of seeing Valentina Pereyra in followup today at the [facility] Otolaryngology Clinic.    CHIEF COMPLAINT: Encounter for mastoid cavity debridement    HISTORY OF PRESENT ILLNESS: Patient is a 71-year-old in today for follow-up from her last visit of 2/9/2021.  She has bilateral mastoid cavities and comes in for yearly cleaning.  She is a longtime patient of Dr. Tijerina's in the past.  She also had a right Baha placement in 2014.  On her follow-up today, she has not had any recent pain or drainage from ears.  She feels her hearing is stable as far she can tell.  She does hear better in the left ear compared to the right without the Baha.  Baha is quite helpful for her.  She has not had any dizziness.  Does have bilateral tinnitus but not problematic.  Denies any dysphagia, hoarseness, facial paresthesias.    MEDICATIONS: Please refer to the detailed medication reconciliation performed by my nurse today, which I have reviewed and signed.     ALLERGIES:    Allergies   Allergen Reactions     Nkda [No Known Drug Allergies]        HABITS: Social History    Substance and Sexual Activity      Alcohol use: Yes        Comment: 2-3/wk     History   Smoking Status     Never Smoker   Smokeless Tobacco     Never Used         PAST MEDICAL HISTORY:  Please see today's intake form (for the remainder of the PMH) which I reviewed and signed.  Past Medical History:   Diagnosis Date     Anxiety attack      Arthritis      Benign positional vertigo      Conductive hearing loss      Hearing loss     chronic bilat     Noninfectious ileitis     IBS     Obstructive sleep  apnea      Osteoporosis      Palpitations      PONV (postoperative nausea and vomiting)      Renal disease     frequency,urgency     Sleep apnea     does not use CPAP     Vertigo        FAMILY HISTORY/SOCIAL HISTORY:    Family History   Problem Relation Age of Onset     Family History Negative Other      Heart Disease Father      Stomach Problem Mother      Osteoporosis Mother      Cancer Brother       Social History     Socioeconomic History     Marital status:      Spouse name: Not on file     Number of children: Not on file     Years of education: Not on file     Highest education level: Not on file   Occupational History     Not on file   Tobacco Use     Smoking status: Never Smoker     Smokeless tobacco: Never Used   Substance and Sexual Activity     Alcohol use: Yes     Comment: 2-3/wk     Drug use: No     Sexual activity: Never   Other Topics Concern     Not on file   Social History Narrative     Not on file     Social Determinants of Health     Financial Resource Strain: Not on file   Food Insecurity: Not on file   Transportation Needs: Not on file   Physical Activity: Not on file   Stress: Not on file   Social Connections: Not on file   Intimate Partner Violence: Not on file   Housing Stability: Not on file       REVIEW OF SYSTEMS: Patient Supplied Answers to Review of Systems   ENT ROS 2/13/2022   Constitutional -   Neurology Headache   Psychology Frequently feeling anxious   Ears, Nose, Throat Hearing loss, Ear pain, Nasal congestion or drainage   Gastrointestinal/Genitourinary -   Musculoskeletal Neck pain   Endocrine -   Other -            The remainder of the 10 point ROS is negative    PHYSICIAL EXAMINATION:  Constitutional: The patient was well-groomed and in no acute distress.   Skin: Warm and pink.  Psychiatric: The patient's affect was calm, cooperative, and appropriate.   Respiratory: Breathing comfortably without stridor or exertion of accessory muscles.  Eyes: Pupils were equal and  reactive. Extraocular movement intact.   Head: Normocephalic and atraumatic. No lesions or scars.  Ears: Patient bilateral cavities with cerumen present.  She is placed on the microscope and under high-power magnification the right side initially was cleaned with instruments including curette, right angle hook, alligator forceps.  Following cleaning, mastoid cavity well visualized to lay open meatus.  Tympanic membrane looks to be intact over middle ear with no worrisome changes or evidence for recurrent cholesteatoma.  Baha area looks well-healed and stable.  The left side was examined cleaned and shows again significant debris cleaned with curette alligator right angle hook similar technique.  She does have a tube present here, unable to clean all the cerumen from around the tube but it is open and patent.  Mastoid cavity partially obscured by a pseudomembrane, no evidence for cholesteatoma active drainage or worrisome changes.  Nose: Sinuses were nontender. Anterior rhinoscopy revealed midline septum and absence of purulence or polyps.  Oral Cavity: Normal tongue, floor of mouth, buccal mucosa, and palate. No abnormal lymph tissue in the oropharynx.   Neck: The parotid is soft without masses. Supple with normal laryngeal and tracheal landmarks.   Lymphatic: There is no palpable lymphadenopathy or other masses in the neck.   Neurologic: Alert and oriented x 3. Cranial nerves III-XI within normal limits. Voice quality normal.  Cerebellar Function Tests:  Grossly normal    Audiogram: Audiogram today shows a bilateral conductive hearing loss, right worse than left.  Excellent discrimination at 92% on the right and 96% on the left.  Fairly stable from last test done 2 years ago and reviewed with patient.    IMPRESSION AND PLAN:   1. Bilateral encounter for mastoid cavity debridement: Cleaned today with microscope and instruments as above.  Stable appearance, no further treatment needed, monitor.  2. Bilateral  conductive hearing loss: Patient functioning well with Baha on the right.  Discussed with her she could trial just a regular hearing aid on the left with bigger mold.  Discussed potential problem with drainage issues but she would just need to trial left if she is interested.  She will consider that and we will pursue that at her discretion.  She did ask about hearing options today.    Thank you very much for the opportunity to participate in the care of your patient.    Rick L Nissen MD

## 2022-02-15 NOTE — NURSING NOTE
"Chief Complaint   Patient presents with     RECHECK     1 year follow up    Blood pressure 105/66, pulse 64, temperature 97.6  F (36.4  C), temperature source Temporal, height 1.765 m (5' 9.5\"), weight 88.5 kg (195 lb), SpO2 97 %. Bernadine Elkins, EMT  "

## 2022-03-31 ENCOUNTER — OFFICE VISIT (OUTPATIENT)
Dept: AUDIOLOGY | Facility: CLINIC | Age: 72
End: 2022-03-31
Payer: COMMERCIAL

## 2022-03-31 DIAGNOSIS — H90.6 MIXED HEARING LOSS, BILATERAL: Primary | ICD-10-CM

## 2022-03-31 DIAGNOSIS — H90.0 CONDUCTIVE HEARING LOSS, BILATERAL: ICD-10-CM

## 2022-03-31 DIAGNOSIS — H91.90 HEARING LOSS, UNSPECIFIED HEARING LOSS TYPE, UNSPECIFIED LATERALITY: ICD-10-CM

## 2022-03-31 PROCEDURE — 99207 PR ASSESSMENT FOR HEARING AID: CPT | Performed by: AUDIOLOGIST

## 2022-03-31 NOTE — PROGRESS NOTES
AUDIOLOGY REPORT    SUBJECTIVE: Valentina Pereyra is a 71 year old female who was seen in the Audiology Clinic at [facility] on 3/31/2022 to discuss concerns with hearing and functional communication difficulties. The patient  has been seen previously on 2/15/2022, and results revealed moderate sloping to profound mixed hearing loss for the left ear and severe rising to moderately severe sloping to profound mixed hearing los for the right ear. The patient has been seen previously in this clinic and was fit with a right [employer] Ponto Plus sound processor on 7/9/2014. The sound processor stopped working and the patient was fit with a right clinic loaner [employer] Ponto 3 SP sound processor on 9/28/2020. She initiated the upgrade process with [employer] and a letter of medical necessity was sent to them on 2/15/2021; however, the patient notes that she never heard anything from them. She was medically cleared for a left hearing aid by Rick L. Nissen, M.D.    OBJECTIVE: [facility] will be contacted regarding what happened with the right sound processor upgrade last year. A courtesy check of the patient's insurance indicated that she has hearing aid benefits through [facility]. Explained that this clinic is not a [facility] provider. The patient would like to expolor this benefit.     ASSESSMENT: No charge visit, as the hearing aid consultation was not completed today.     PLAN: Valentina will return for fitting of the right sound processor upgrade once its received. Please contact this clinic with any questions or concerns.      Elisabeth Klein., [facility]  Licensed Audiologist  [place] #[id number]

## 2022-04-06 ENCOUNTER — DOCUMENTATION ONLY (OUTPATIENT)
Dept: AUDIOLOGY | Facility: CLINIC | Age: 72
End: 2022-04-06
Payer: COMMERCIAL

## 2022-04-06 NOTE — PROGRESS NOTES
LMN, chart notes, and audiogram sent to [facility] for right Ponto 5 upgrade.    Jeffrey Klein, CCC-A  Licensed Audiologist  [place] #[id number]

## 2022-04-28 ENCOUNTER — OFFICE VISIT (OUTPATIENT)
Dept: AUDIOLOGY | Facility: CLINIC | Age: 72
End: 2022-04-28
Payer: COMMERCIAL

## 2022-04-28 DIAGNOSIS — H90.6 MIXED HEARING LOSS, BILATERAL: Primary | ICD-10-CM

## 2022-04-28 PROCEDURE — 92700 UNLISTED ORL SERVICE/PX: CPT | Performed by: AUDIOLOGIST

## 2022-04-28 NOTE — PROGRESS NOTES
AUDIOLOGY REPORT    SUBJECTIVE: Valentina Pereyra is a 71 year old female who was seen in the Audiology Clinic at [facility] for fitting of a right [employer] Ponto 5 sound processor upgrade. Previous results have revealed moderate sloping to profound mixed hearing loss for the left ear and severe rising to moderately severe sloping to profound mixed hearing los for the right ear. The patient has been seen previously in this clinic and was fit with a right [employer] Ponto Plus sound processor on 7/9/2014. The sound processor stopped working and the patient was fit with a right clinic loaner [employer] Ponto 3 SP sound processor on 9/28/2020. She was recently fit with a left hearing aid through [facility], but she notes she does not feel like it is helping much, as it is not loud enough.     OBJECTIVE: The abutment site was inspected and found to be clean. A shake test confirmed good osseointegration. A check of the screw indicated it was tight. The new sound processor was placed on the abutment. Feedback test was run. In-situ bone conduction thresholds were obtained and used for gain settings. Valentina reported good volume and sound quality. Reviewed proper use, care, cleaning (no water, dry brush), batteries (size: 312, insertion/removal, toxicity, low-battery signal), aid insertion/removal, user booklet, warranty information, storage cases, and other details. The sound processor was paired to the patient's iPhone and the [employer] ON day. Use of both was reviewed.    EAR FIT: Right  MODEL NAME: [employer] Ponto 5  SERIAL NUMBER: [id number]  WARRANTY END DATE: 7/21/2025 for repairs; 7/20/2024 for loss and damage    ASSESSMENT: A right [employer] Ponto 5 sound processor upgrade was fit today. The right clinic loaner [employer] Ponto 3 SP sound processor was returned today.     PLAN: Valentina will return for follow-up as needed. It is recommended that she  follow-up with her hearing aid provider for adjustments. Please call this clinic with questions regarding today s appointment.    Jeffrey Klein, [facility]  Licensed Audiologist  [place] #[id number]

## 2022-05-14 ENCOUNTER — HEALTH MAINTENANCE LETTER (OUTPATIENT)
Age: 72
End: 2022-05-14

## 2022-09-03 ENCOUNTER — HEALTH MAINTENANCE LETTER (OUTPATIENT)
Age: 72
End: 2022-09-03

## 2022-12-20 ENCOUNTER — OFFICE VISIT (OUTPATIENT)
Dept: AUDIOLOGY | Facility: CLINIC | Age: 72
End: 2022-12-20
Payer: COMMERCIAL

## 2022-12-20 ENCOUNTER — OFFICE VISIT (OUTPATIENT)
Dept: OTOLARYNGOLOGY | Facility: CLINIC | Age: 72
End: 2022-12-20
Payer: COMMERCIAL

## 2022-12-20 VITALS
TEMPERATURE: 97.2 F | HEIGHT: 69 IN | OXYGEN SATURATION: 95 % | HEART RATE: 58 BPM | DIASTOLIC BLOOD PRESSURE: 69 MMHG | WEIGHT: 197 LBS | SYSTOLIC BLOOD PRESSURE: 111 MMHG | BODY MASS INDEX: 29.18 KG/M2

## 2022-12-20 DIAGNOSIS — H95.191 ENCOUNTER FOR MASTOIDECTOMY CAVITY DEBRIDEMENT, RIGHT: ICD-10-CM

## 2022-12-20 DIAGNOSIS — H93.13 TINNITUS, BILATERAL: ICD-10-CM

## 2022-12-20 DIAGNOSIS — H90.0 CONDUCTIVE HEARING LOSS, BILATERAL: Primary | ICD-10-CM

## 2022-12-20 DIAGNOSIS — H90.6 MIXED HEARING LOSS, BILATERAL: Primary | ICD-10-CM

## 2022-12-20 DIAGNOSIS — H72.92 PERFORATION OF TYMPANIC MEMBRANE, LEFT: ICD-10-CM

## 2022-12-20 PROCEDURE — 99214 OFFICE O/P EST MOD 30 MIN: CPT | Performed by: OTOLARYNGOLOGY

## 2022-12-20 PROCEDURE — 92557 COMPREHENSIVE HEARING TEST: CPT | Performed by: AUDIOLOGIST

## 2022-12-20 ASSESSMENT — PAIN SCALES - GENERAL: PAINLEVEL: NO PAIN (0)

## 2022-12-20 NOTE — NURSING NOTE
"Chief Complaint   Patient presents with     RECHECK     Otalgia  left ear        Blood pressure 111/69, pulse 58, temperature 97.2  F (36.2  C), height 1.753 m (5' 9\"), weight 89.4 kg (197 lb), SpO2 95 %.    Jhon Sagastume LPN    "

## 2022-12-20 NOTE — LETTER
Date:December 21, 2022      Patient was self referred, no letter generated. Do not send.        [facility] Health Information

## 2022-12-20 NOTE — PROGRESS NOTES
AUDIOLOGY REPORT    SUMMARY: Audiology visit completed. See audiogram for results.      RECOMMENDATIONS: Follow-up with ENT.    Jeffrey Klein, [facility]  Licensed Audiologist  [place] #[id number]

## 2022-12-20 NOTE — LETTER
12/20/2022       RE: Valentina Pereyra  [address] [place] [address]     Dear Colleague,    Thank you for referring your patient, Valentina Pereyra, to the [facility] EAR NOSE AND THROAT CLINIC [place] at [facility]. Please see a copy of my visit note below.    Dear Dr. Cameron, Provider Not In:    I had the pleasure of seeing Valentina Pereyra in followup today at the [facility] Otolaryngology Clinic.    CHIEF COMPLAINT: Ears    HISTORY OF PRESENT ILLNESS: Patient is a 71-year-old in today for follow-up from her last visit of 2/15/2022.  She had bilateral ear surgery, has mastoid cavity on the right side as well as a right Baha placed in 2014.  She comes in for periodic cleaning.  She had a tube placed on the left side.  She has some recent pain in the left ear, no actual drainage.  She feels her hearing has been fairly stable.  She does have a large conductive hearing loss in both ears.  She again has a Baha on the right side which is usual and quite functional for her.  Left such as dry to hearing aid but was not able to function with that.  Is been no dizziness.  Denies any dysphagia, hoarseness, facial paresthesias.    MEDICATIONS: Please refer to the detailed medication reconciliation performed by my nurse today, which I have reviewed and signed.     ALLERGIES:    Allergies   Allergen Reactions     Nkda [No Known Drug Allergies]        HABITS: Social History    Substance and Sexual Activity      Alcohol use: Yes        Comment: 2-3/wk     History   Smoking Status     Never   Smokeless Tobacco     Never         PAST MEDICAL HISTORY:  Please see today's intake form (for the remainder of the PMH) which I reviewed and signed.  Past Medical History:   Diagnosis Date     Anxiety attack      Arthritis      Benign positional vertigo      Conductive hearing loss      Hearing loss     chronic bilat     Noninfectious ileitis     IBS     Obstructive sleep  apnea      Osteoporosis      Palpitations      PONV (postoperative nausea and vomiting)      Renal disease     frequency,urgency     Sleep apnea     does not use CPAP     Vertigo        FAMILY HISTORY/SOCIAL HISTORY:    Family History   Problem Relation Age of Onset     Family History Negative Other      Heart Disease Father      Stomach Problem Mother      Osteoporosis Mother      Cancer Brother       Social History     Socioeconomic History     Marital status:      Spouse name: Not on file     Number of children: Not on file     Years of education: Not on file     Highest education level: Not on file   Occupational History     Not on file   Tobacco Use     Smoking status: Never     Smokeless tobacco: Never   Substance and Sexual Activity     Alcohol use: Yes     Comment: 2-3/wk     Drug use: No     Sexual activity: Never   Other Topics Concern     Not on file   Social History Narrative     Not on file     Social Determinants of Health     Financial Resource Strain: Not on file   Food Insecurity: Not on file   Transportation Needs: Not on file   Physical Activity: Not on file   Stress: Not on file   Social Connections: Not on file   Intimate Partner Violence: Not on file   Housing Stability: Not on file       REVIEW OF SYSTEMS: Patient Supplied Answers to Review of Systems   ENT ROS 12/20/2022   Constitutional: Problems with sleep   Neurology: Dizzy spells, Headache   Psychology: -   Ears, Nose, Throat: Ear pain   Gastrointestinal/Genitourinary: -   Musculoskeletal: Back pain, Neck pain   Endocrine: -   Other: -            The remainder of the 10 point ROS is negative    PHYSICIAL EXAMINATION:  Constitutional: The patient was well-groomed and in no acute distress.   Skin: Warm and pink.  Psychiatric: The patient's affect was calm, cooperative, and appropriate.   Respiratory: Breathing comfortably without stridor or exertion of accessory muscles.  Eyes: Pupils were equal and reactive. Extraocular movement  intact.   Head: Normocephalic and atraumatic. No lesions or scars.  Ears: Both ears examined on the microscope.  Right side was cleaned of excessive cerumen.  Again she has a constricted ear exam with mastoid partially obscured.  Tympanic membrane looks intact and looks like she has adhesive process.  Stable appearance no evidence for cholesteatoma.  Baha looks stable.  Left side was cleaned of significant debris, tympanic membrane shows a large inferior perforation, clean and dry today.  Tube has extruded.  Nose: Sinuses were nontender. Anterior rhinoscopy revealed midline septum and absence of purulence or polyps.  Oral Cavity: Normal tongue, floor of mouth, buccal mucosa, and palate. No abnormal lymph tissue in the oropharynx.   Neck: The parotid is soft without masses. Supple with normal laryngeal and tracheal landmarks.   Lymphatic: There is no palpable lymphadenopathy or other masses in the neck.   Neurologic: Alert and oriented x 3. Cranial nerves III-XI within normal limits. Voice quality normal.  Cerebellar Function Tests:  Grossly normal    Audiogram: Audiogram shows a large conductive hearing loss in each ear.  Good discrimination of 96% on the right and 100% on the left.    IMPRESSION AND PLAN:   1. Bilateral conductive hearing loss: Continue to maximize benefit from Baha hearing aid on the right side.  She is unable to wear hearing aid on the left ear and at this point is not interested in a Baha on the left side.  Continue to monitor.  Recommend come in yearly for cleaning.  2. , For mastoid cavity debridement: Ears cleaned today with microscope and instruments as above.  Monitor.  3. Bilateral tinnitus: Not problematic, no treatment needed, monitor.  4. Left tympanic membrane perforation: Tube has extruded, recommend monitor for now.    Thank you very much for the opportunity to participate in the care of your patient.    Rick L Nissen MD              Again, thank you for allowing me to participate in  the care of your patient.      Sincerely,    Rick L. Nissen, MD

## 2022-12-20 NOTE — PROGRESS NOTES
Dear Dr. Cameron, Provider Not In:    I had the pleasure of seeing Valentina Pereyra in followup today at the [facility] Otolaryngology Clinic.    CHIEF COMPLAINT: Ears    HISTORY OF PRESENT ILLNESS: Patient is a 71-year-old in today for follow-up from her last visit of 2/15/2022.  She had bilateral ear surgery, has mastoid cavity on the right side as well as a right Baha placed in 2014.  She comes in for periodic cleaning.  She had a tube placed on the left side.  She has some recent pain in the left ear, no actual drainage.  She feels her hearing has been fairly stable.  She does have a large conductive hearing loss in both ears.  She again has a Baha on the right side which is usual and quite functional for her.  Left such as dry to hearing aid but was not able to function with that.  Is been no dizziness.  Denies any dysphagia, hoarseness, facial paresthesias.    MEDICATIONS: Please refer to the detailed medication reconciliation performed by my nurse today, which I have reviewed and signed.     ALLERGIES:    Allergies   Allergen Reactions     Nkda [No Known Drug Allergies]        HABITS: Social History    Substance and Sexual Activity      Alcohol use: Yes        Comment: 2-3/wk     History   Smoking Status     Never   Smokeless Tobacco     Never         PAST MEDICAL HISTORY:  Please see today's intake form (for the remainder of the PMH) which I reviewed and signed.  Past Medical History:   Diagnosis Date     Anxiety attack      Arthritis      Benign positional vertigo      Conductive hearing loss      Hearing loss     chronic bilat     Noninfectious ileitis     IBS     Obstructive sleep apnea      Osteoporosis      Palpitations      PONV (postoperative nausea and vomiting)      Renal disease     frequency,urgency     Sleep apnea     does not use CPAP     Vertigo        FAMILY HISTORY/SOCIAL HISTORY:    Family History   Problem Relation Age of Onset     Family History Negative Other      Heart Disease  Father      Stomach Problem Mother      Osteoporosis Mother      Cancer Brother       Social History     Socioeconomic History     Marital status:      Spouse name: Not on file     Number of children: Not on file     Years of education: Not on file     Highest education level: Not on file   Occupational History     Not on file   Tobacco Use     Smoking status: Never     Smokeless tobacco: Never   Substance and Sexual Activity     Alcohol use: Yes     Comment: 2-3/wk     Drug use: No     Sexual activity: Never   Other Topics Concern     Not on file   Social History Narrative     Not on file     Social Determinants of Health     Financial Resource Strain: Not on file   Food Insecurity: Not on file   Transportation Needs: Not on file   Physical Activity: Not on file   Stress: Not on file   Social Connections: Not on file   Intimate Partner Violence: Not on file   Housing Stability: Not on file       REVIEW OF SYSTEMS: Patient Supplied Answers to Review of Systems   ENT ROS 12/20/2022   Constitutional: Problems with sleep   Neurology: Dizzy spells, Headache   Psychology: -   Ears, Nose, Throat: Ear pain   Gastrointestinal/Genitourinary: -   Musculoskeletal: Back pain, Neck pain   Endocrine: -   Other: -            The remainder of the 10 point ROS is negative    PHYSICIAL EXAMINATION:  Constitutional: The patient was well-groomed and in no acute distress.   Skin: Warm and pink.  Psychiatric: The patient's affect was calm, cooperative, and appropriate.   Respiratory: Breathing comfortably without stridor or exertion of accessory muscles.  Eyes: Pupils were equal and reactive. Extraocular movement intact.   Head: Normocephalic and atraumatic. No lesions or scars.  Ears: Both ears examined on the microscope.  Right side was cleaned of excessive cerumen.  Again she has a constricted ear exam with mastoid partially obscured.  Tympanic membrane looks intact and looks like she has adhesive process.  Stable appearance  no evidence for cholesteatoma.  Baha looks stable.  Left side was cleaned of significant debris, tympanic membrane shows a large inferior perforation, clean and dry today.  Tube has extruded.  Nose: Sinuses were nontender. Anterior rhinoscopy revealed midline septum and absence of purulence or polyps.  Oral Cavity: Normal tongue, floor of mouth, buccal mucosa, and palate. No abnormal lymph tissue in the oropharynx.   Neck: The parotid is soft without masses. Supple with normal laryngeal and tracheal landmarks.   Lymphatic: There is no palpable lymphadenopathy or other masses in the neck.   Neurologic: Alert and oriented x 3. Cranial nerves III-XI within normal limits. Voice quality normal.  Cerebellar Function Tests:  Grossly normal    Audiogram: Audiogram shows a large conductive hearing loss in each ear.  Good discrimination of 96% on the right and 100% on the left.    IMPRESSION AND PLAN:   1. Bilateral conductive hearing loss: Continue to maximize benefit from Baha hearing aid on the right side.  She is unable to wear hearing aid on the left ear and at this point is not interested in a Baha on the left side.  Continue to monitor.  Recommend come in yearly for cleaning.  2. , For mastoid cavity debridement: Ears cleaned today with microscope and instruments as above.  Monitor.  3. Bilateral tinnitus: Not problematic, no treatment needed, monitor.  4. Left tympanic membrane perforation: Tube has extruded, recommend monitor for now.    Thank you very much for the opportunity to participate in the care of your patient.    Rick L Nissen MD

## 2022-12-20 NOTE — PATIENT INSTRUCTIONS
1. You were seen in the ENT Clinic today by Dr. Nissen.  If you have any questions or concerns after your appointment, please call   - Option 1: ENT Clinic: [phone]   - Option 2: Danii (Dr. Nissen's Nurse): [phone]                   Caron (Dr. Nissen's Nurse): [phone]      2.   Plan to return to clinic in 1 year with hearing test    3. Keep March appt, cancel if not needed      How to Contact Us:  Send a SiVerion message to your provider. Our team will respond to you via SiVerion. Occasionally, we will need to call you to get further information.  For urgent matters (Monday-Friday), call the ENT Clinic: [phone] and speak with a call center team member - they will route your call appropriately.   If you'd like to speak directly with a nurse, please find our contact information below. We do our best to check voicemail frequently throughout the day, and will work to call you back within 1-2 days. For urgent matters, please use the general clinic phone numbers listed above.       Danii WILSON LPN  [facility] - Otolaryngology

## 2023-01-15 ENCOUNTER — HEALTH MAINTENANCE LETTER (OUTPATIENT)
Age: 73
End: 2023-01-15

## 2023-06-03 ENCOUNTER — HEALTH MAINTENANCE LETTER (OUTPATIENT)
Age: 73
End: 2023-06-03

## 2023-09-14 ENCOUNTER — TELEPHONE (OUTPATIENT)
Dept: OTOLARYNGOLOGY | Facility: CLINIC | Age: 73
End: 2023-09-14
Payer: COMMERCIAL

## 2023-09-14 NOTE — TELEPHONE ENCOUNTER
LVM to reschedule upcoming visit with Dr. Nissen as he is retiring. If there is an opening with Nissen prior to 11/14, patient can reschedule. Patient can reschedule to Sarah or Tina if after that date. If they are a return for Dr. Nissen, they can be a return with either provider. Gave call center number

## 2023-11-07 ENCOUNTER — OFFICE VISIT (OUTPATIENT)
Dept: AUDIOLOGY | Facility: CLINIC | Age: 73
End: 2023-11-07
Payer: COMMERCIAL

## 2023-11-07 ENCOUNTER — OFFICE VISIT (OUTPATIENT)
Dept: OTOLARYNGOLOGY | Facility: CLINIC | Age: 73
End: 2023-11-07
Payer: COMMERCIAL

## 2023-11-07 VITALS
TEMPERATURE: 97.5 F | SYSTOLIC BLOOD PRESSURE: 123 MMHG | WEIGHT: 192 LBS | OXYGEN SATURATION: 96 % | DIASTOLIC BLOOD PRESSURE: 65 MMHG | BODY MASS INDEX: 28.44 KG/M2 | HEART RATE: 60 BPM | HEIGHT: 69 IN

## 2023-11-07 DIAGNOSIS — H72.92 PERFORATION OF TYMPANIC MEMBRANE, LEFT: ICD-10-CM

## 2023-11-07 DIAGNOSIS — H90.6 MIXED HEARING LOSS, BILATERAL: Primary | ICD-10-CM

## 2023-11-07 DIAGNOSIS — H95.191 ENCOUNTER FOR MASTOIDECTOMY CAVITY DEBRIDEMENT, RIGHT: ICD-10-CM

## 2023-11-07 DIAGNOSIS — H90.0 CONDUCTIVE HEARING LOSS, BILATERAL: Primary | ICD-10-CM

## 2023-11-07 PROCEDURE — 92557 COMPREHENSIVE HEARING TEST: CPT | Performed by: AUDIOLOGIST

## 2023-11-07 PROCEDURE — 99213 OFFICE O/P EST LOW 20 MIN: CPT | Mod: 25 | Performed by: OTOLARYNGOLOGY

## 2023-11-07 PROCEDURE — 69220 CLEAN OUT MASTOID CAVITY: CPT | Mod: RT | Performed by: OTOLARYNGOLOGY

## 2023-11-07 ASSESSMENT — PAIN SCALES - GENERAL: PAINLEVEL: NO PAIN (0)

## 2023-11-07 NOTE — LETTER
"11/7/2023       RE: Valentina Pereyra  [address] [place] [address]     Dear Colleague,    Thank you for referring your patient, Valentina Pereyra, to the [facility] EAR NOSE AND THROAT CLINIC [place] at [facility]. Please see a copy of my visit note below.    Chief Complaint   Patient presents with    RECHECK     Follow up     Blood pressure 123/65, pulse 60, temperature 97.5  F (36.4  C), height 1.753 m (5' 9\"), weight 87.1 kg (192 lb), SpO2 96%.  Jhon Sagastume LPN      Dear April Carson:    I had the pleasure of seeing Valentina Pereyra in followup today at the [facility] Otolaryngology Clinic.    CHIEF COMPLAINT: Encounter for right mastoid cavity debridement    HISTORY OF PRESENT ILLNESS: Patient is a 72-year-old in today for follow-up from her last visit of 12/20/2022.  She comes in for periodic ear cleaning and mastoid cleaning.  She has had bilateral ear surgery, has a mastoid cavity from a canal wall down surgical procedure in the past and had a Baha right placed in 2014.  Has had a tube on the left side as well and has a persistent perforation there.  On her follow-up today, she denies any pain or drainage.  She feels her hearing has been stable.  The ball has been very beneficial for her on the right side.  She wonders about a Baha on the left.  She denies any dizziness.  Denies any dysphagia, hoarseness, facial paresthesias.    MEDICATIONS: Please refer to the detailed medication reconciliation performed by my nurse today, which I have reviewed and signed.     ALLERGIES:    Allergies   Allergen Reactions    Nkda [No Known Drug Allergy]        HABITS: Social History    Substance and Sexual Activity      Alcohol use: Yes        Comment: 2-3/wk     History   Smoking Status    Never   Smokeless Tobacco    Never         PAST MEDICAL HISTORY:  Please see today's intake form (for the remainder of the PMH) which I reviewed and " signed.  Past Medical History:   Diagnosis Date    Anxiety attack     Arthritis     Benign positional vertigo     Conductive hearing loss     Hearing loss     chronic bilat    Noninfectious ileitis     IBS    Obstructive sleep apnea     Osteoporosis     Palpitations     PONV (postoperative nausea and vomiting)     Renal disease     frequency,urgency    Sleep apnea     does not use CPAP    Vertigo        FAMILY HISTORY/SOCIAL HISTORY:    Family History   Problem Relation Age of Onset    Family History Negative Other     Heart Disease Father     Stomach Problem Mother     Osteoporosis Mother     Cancer Brother       Social History     Socioeconomic History    Marital status:      Spouse name: Not on file    Number of children: Not on file    Years of education: Not on file    Highest education level: Not on file   Occupational History    Not on file   Tobacco Use    Smoking status: Never    Smokeless tobacco: Never   Substance and Sexual Activity    Alcohol use: Yes     Comment: 2-3/wk    Drug use: No    Sexual activity: Never   Other Topics Concern    Not on file   Social History Narrative    Not on file     Social Determinants of Health     Financial Resource Strain: Not on file   Food Insecurity: Not on file   Transportation Needs: Not on file   Physical Activity: Not on file   Stress: Not on file   Social Connections: Not on file   Interpersonal Safety: Not on file   Housing Stability: Not on file       REVIEW OF SYSTEMS: Patient Supplied Answers to Review of Systems      10/31/2023    10:04 PM    ENT ROS   Neurology Dizzy spells   Ears, Nose, Throat Hearing loss   Gastrointestinal/Genitourinary Constipation    Diarrhea   Musculoskeletal Sore or stiff joints            The remainder of the 10 point ROS is negative    PHYSICIAL EXAMINATION:  Constitutional: The patient was well-groomed and in no acute distress.   Skin: Warm and pink.  Psychiatric: The patient's affect was calm, cooperative, and  appropriate.   Respiratory: Breathing comfortably without stridor or exertion of accessory muscles.  Eyes: Pupils were equal and reactive. Extraocular movement intact.   Head: Normocephalic and atraumatic. No lesions or scars.  Ears: Both ears examined today under the microscope.  Right side was cleaned, cavity well visualized.  Significant debris within the mastoid cavity was removed with instruments.  Following that tympanic membrane looks intact with no worrisome changes.  The left ear cleaned of debris and shows tympanic membrane-inferior perforation.  Clean and dry today, no worrisome changes.  Nose: Sinuses were nontender. Anterior rhinoscopy revealed midline septum and absence of purulence or polyps.  Oral Cavity: Normal tongue, floor of mouth, buccal mucosa, and palate. No abnormal lymph tissue in the oropharynx.   Neck: The parotid is soft without masses. Supple with normal laryngeal and tracheal landmarks.   Lymphatic: There is no palpable lymphadenopathy or other masses in the neck.   Neurologic: Alert and oriented x 3. Cranial nerves III-XI within normal limits. Voice quality normal.  Cerebellar Function Tests:  Grossly normal    Audiogram:  audiogram today shows bilateral significant active loss fairly stable from a year ago.  Maintains excellent discrimination 100% in each ear.    Baha abutment: Right postauricular area looks clean and dry today, well-healed and stable.    IMPRESSION AND PLAN:   Encounter for right mastoid cavity debridement: Cleaned today with instruments microscope as above.  Stable appearance, continue to monitor.  Left tympanic membrane perforation: Clean and dry, no worrisome changes, continue to monitor.  Keep ear dry.    Patient will follow-up in 1 year to continue to monitor as above.    Thank you very much for the opportunity to participate in the care of your patient.    Rick L Nissen MD

## 2023-11-07 NOTE — PROGRESS NOTES
AUDIOLOGY REPORT    SUMMARY: Audiology visit completed. See audiogram for results.      RECOMMENDATIONS: Follow-up with ENT.    Jeffrey Perez, CCC-A  Clinical Audiologist  [place] #[id number]

## 2023-11-07 NOTE — PROGRESS NOTES
Dear April Carson:    I had the pleasure of seeing Valentina Pereyra in followup today at the [facility].    CHIEF COMPLAINT: Encounter for right mastoid cavity debridement    HISTORY OF PRESENT ILLNESS: Patient is a 72-year-old in today for follow-up from her last visit of 12/20/2022.  She comes in for periodic ear cleaning and mastoid cleaning.  She has had bilateral ear surgery, has a mastoid cavity from a canal wall down surgical procedure in the past and had a Baha right placed in 2014.  Has had a tube on the left side as well and has a persistent perforation there.  On her follow-up today, she denies any pain or drainage.  She feels her hearing has been stable.  The ball has been very beneficial for her on the right side.  She wonders about a Baha on the left.  She denies any dizziness.  Denies any dysphagia, hoarseness, facial paresthesias.    MEDICATIONS: Please refer to the detailed medication reconciliation performed by my nurse today, which I have reviewed and signed.     ALLERGIES:    Allergies   Allergen Reactions    Nkda [No Known Drug Allergy]        HABITS: Social History    Substance and Sexual Activity      Alcohol use: Yes        Comment: 2-3/wk     History   Smoking Status    Never   Smokeless Tobacco    Never         PAST MEDICAL HISTORY:  Please see today's intake form (for the remainder of the PMH) which I reviewed and signed.  Past Medical History:   Diagnosis Date    Anxiety attack     Arthritis     Benign positional vertigo     Conductive hearing loss     Hearing loss     chronic bilat    Noninfectious ileitis     IBS    Obstructive sleep apnea     Osteoporosis     Palpitations     PONV (postoperative nausea and vomiting)     Renal disease     frequency,urgency    Sleep apnea     does not use CPAP    Vertigo        FAMILY HISTORY/SOCIAL HISTORY:    Family History   Problem Relation Age of Onset    Family History Negative Other     Heart Disease  Father     Stomach Problem Mother     Osteoporosis Mother     Cancer Brother       Social History     Socioeconomic History    Marital status:      Spouse name: Not on file    Number of children: Not on file    Years of education: Not on file    Highest education level: Not on file   Occupational History    Not on file   Tobacco Use    Smoking status: Never    Smokeless tobacco: Never   Substance and Sexual Activity    Alcohol use: Yes     Comment: 2-3/wk    Drug use: No    Sexual activity: Never   Other Topics Concern    Not on file   Social History Narrative    Not on file     Social Determinants of Health     Financial Resource Strain: Not on file   Food Insecurity: Not on file   Transportation Needs: Not on file   Physical Activity: Not on file   Stress: Not on file   Social Connections: Not on file   Interpersonal Safety: Not on file   Housing Stability: Not on file       REVIEW OF SYSTEMS: Patient Supplied Answers to Review of Systems      10/31/2023    10:04 PM    ENT ROS   Neurology Dizzy spells   Ears, Nose, Throat Hearing loss   Gastrointestinal/Genitourinary Constipation    Diarrhea   Musculoskeletal Sore or stiff joints            The remainder of the 10 point ROS is negative    PHYSICIAL EXAMINATION:  Constitutional: The patient was well-groomed and in no acute distress.   Skin: Warm and pink.  Psychiatric: The patient's affect was calm, cooperative, and appropriate.   Respiratory: Breathing comfortably without stridor or exertion of accessory muscles.  Eyes: Pupils were equal and reactive. Extraocular movement intact.   Head: Normocephalic and atraumatic. No lesions or scars.  Ears: Both ears examined today under the microscope.  Right side was cleaned, cavity well visualized.  Significant debris within the mastoid cavity was removed with instruments.  Following that tympanic membrane looks intact with no worrisome changes.  The left ear cleaned of debris and shows tympanic membrane-inferior  perforation.  Clean and dry today, no worrisome changes.  Nose: Sinuses were nontender. Anterior rhinoscopy revealed midline septum and absence of purulence or polyps.  Oral Cavity: Normal tongue, floor of mouth, buccal mucosa, and palate. No abnormal lymph tissue in the oropharynx.   Neck: The parotid is soft without masses. Supple with normal laryngeal and tracheal landmarks.   Lymphatic: There is no palpable lymphadenopathy or other masses in the neck.   Neurologic: Alert and oriented x 3. Cranial nerves III-XI within normal limits. Voice quality normal.  Cerebellar Function Tests:  Grossly normal    Audiogram:  audiogram today shows bilateral significant active loss fairly stable from a year ago.  Maintains excellent discrimination 100% in each ear.    Baha abutment: Right postauricular area looks clean and dry today, well-healed and stable.    IMPRESSION AND PLAN:   Encounter for right mastoid cavity debridement: Cleaned today with instruments microscope as above.  Stable appearance, continue to monitor.  Left tympanic membrane perforation: Clean and dry, no worrisome changes, continue to monitor.  Keep ear dry.    Patient will follow-up in 1 year to continue to monitor as above.    Thank you very much for the opportunity to participate in the care of your patient.    Rick L Nissen MD

## 2023-11-07 NOTE — NURSING NOTE
"Chief Complaint   Patient presents with    RECHECK     Follow up     Blood pressure 123/65, pulse 60, temperature 97.5  F (36.4  C), height 1.753 m (5' 9\"), weight 87.1 kg (192 lb), SpO2 96%.  Jhon Sagastume LPN    "

## 2023-11-07 NOTE — PATIENT INSTRUCTIONS
1. You were seen in the ENT Clinic today by Dr. Nissen.  If you have any questions or concerns after your appointment, please call   - Option 1: ENT Clinic: [phone]   - Option 2: Danii (Dr.Nissen's Nurse): [phone]       Caron (Dr. Nissen's Nurse): [phone]    2.   Plan to return to clinic as needed    3. See audiology for BAHA consult    4. Consult with surgical provider after BAHA consult      How to Contact Us:  Send a [employer] message to your provider. Our team will respond to you via [employer]. Occasionally, we will need to call you to get further information.  For urgent matters (Monday-Friday), call the ENT Clinic: [phone] and speak with a call center team member - they will route your call appropriately.   If you'd like to speak directly with a nurse, please find our contact information below. We do our best to check voicemail frequently throughout the day, and will work to call you back within 1-2 days. For urgent matters, please use the general clinic phone numbers listed above.      Danii WILSON LPN  [facility] - Otolaryngology

## 2023-11-08 NOTE — TELEPHONE ENCOUNTER
FUTURE VISIT INFORMATION      FUTURE VISIT INFORMATION:  Date: 1/17/2024  Time: 10:45 AM  Location: [facility]  REFERRAL INFORMATION:  Referring provider:    Referring providers clinic:    Reason for visit/diagnosis  BAHA Consult     RECORDS REQUESTED FROM:       Clinic name Comments Records Status Imaging Status   [facility] E[facility] rai 11/8/23 OV with Nissen, Rick L, MD   4/28/22 OV with Tina Navarro AuD     Audiograms  11/8/23, 2012- 2022 7/6/2018 OV with Bruno umaña MD  [facility] CT Temporal orbital 1/2/13, 2/22/12  Epic PACS   Procedure - [facility] 10/23/2014 Left Tympanomastoidectomy     4/8/2014 Right Oticon Osseointegrated Hearing Device     4/5/2012 Right Revision Tympanomastoidectomy with Cartilage Graft and Dermamatrix placement, Meatoplasty, Total Osssicular Replacement Prosthesis / Left ear Myringotomy with left ear T-tube placement  [facility]    [facility] CT head 9/23/2020 CE Req 11/8/23  PACS     November 8, 2023 12:15 PM - Called [employer] [place] film [facility] to push CT head to  [employer]. Image request from [employer] -[place]  November 15, 2023 10:24 AM - Image resolved in PACS -[facility]

## 2024-01-17 ENCOUNTER — PRE VISIT (OUTPATIENT)
Dept: OTOLARYNGOLOGY | Facility: CLINIC | Age: 74
End: 2024-01-17

## 2024-01-17 ENCOUNTER — OFFICE VISIT (OUTPATIENT)
Dept: OTOLARYNGOLOGY | Facility: CLINIC | Age: 74
End: 2024-01-17
Payer: COMMERCIAL

## 2024-01-17 ENCOUNTER — OFFICE VISIT (OUTPATIENT)
Dept: AUDIOLOGY | Facility: CLINIC | Age: 74
End: 2024-01-17
Payer: COMMERCIAL

## 2024-01-17 VITALS
HEIGHT: 69 IN | BODY MASS INDEX: 29.03 KG/M2 | SYSTOLIC BLOOD PRESSURE: 112 MMHG | OXYGEN SATURATION: 95 % | DIASTOLIC BLOOD PRESSURE: 76 MMHG | HEART RATE: 86 BPM | WEIGHT: 196 LBS | TEMPERATURE: 97.8 F

## 2024-01-17 DIAGNOSIS — H90.6 MIXED CONDUCTIVE AND SENSORINEURAL HEARING LOSS OF BOTH EARS: Primary | ICD-10-CM

## 2024-01-17 DIAGNOSIS — H90.6 MIXED HEARING LOSS, BILATERAL: Primary | ICD-10-CM

## 2024-01-17 DIAGNOSIS — H72.92 PERFORATION OF TYMPANIC MEMBRANE, LEFT: ICD-10-CM

## 2024-01-17 PROCEDURE — 92590 PR HEARING AID EXAM MONAURAL: CPT | Mod: LT | Performed by: AUDIOLOGIST

## 2024-01-17 PROCEDURE — 99214 OFFICE O/P EST MOD 30 MIN: CPT | Mod: 25 | Performed by: OTOLARYNGOLOGY

## 2024-01-17 PROCEDURE — 92504 EAR MICROSCOPY EXAMINATION: CPT | Performed by: OTOLARYNGOLOGY

## 2024-01-17 RX ORDER — CEFAZOLIN SODIUM 2 G/50ML
2 SOLUTION INTRAVENOUS
Status: CANCELLED | OUTPATIENT
Start: 2024-01-17

## 2024-01-17 RX ORDER — ACETAMINOPHEN 325 MG/1
975 TABLET ORAL ONCE
Status: CANCELLED | OUTPATIENT
Start: 2024-01-17 | End: 2024-01-17

## 2024-01-17 RX ORDER — DEXAMETHASONE SODIUM PHOSPHATE 4 MG/ML
10 INJECTION, SOLUTION INTRA-ARTICULAR; INTRALESIONAL; INTRAMUSCULAR; INTRAVENOUS; SOFT TISSUE ONCE
Status: CANCELLED | OUTPATIENT
Start: 2024-01-17 | End: 2024-01-17

## 2024-01-17 RX ORDER — CEFAZOLIN SODIUM 2 G/50ML
2 SOLUTION INTRAVENOUS SEE ADMIN INSTRUCTIONS
Status: CANCELLED | OUTPATIENT
Start: 2024-01-17

## 2024-01-17 ASSESSMENT — PAIN SCALES - GENERAL: PAINLEVEL: NO PAIN (0)

## 2024-01-17 NOTE — LETTER
1/17/2024       RE: Valentina Pereyra  [address] [place] [address]     Dear Colleague,    Thank you for referring your patient, Valentina Pereyra, to the [facility] [place] at [facility]. Please see a copy of my visit note below.    Valentina Pereyra is seen in consultation from Dr. Nissen.  She is a 73 year old female being seen for left osseointegrated implant with sound processor consult. She is s/p left tympanomastoidectomy 10/23/2014, right Oticon BAHA 4/8/2014, and right revision tympanomastoidectomy with TORP and left T-tube placement 4/5/2012. She is interested in a left osseointegrated implant with sound processor as she has difficult tolerating a traditional hearing aid due to a left perforation.    Today, she reports the right BAHA has had a positive effect on her hearing and would like to repeat the procedure on the left side. She has tried the softband and liked the sound quality. She currently has no otalgia or otorrhea on either side. She reports her hearing is stable bilaterally. She reports she is having a lot of nasal congestion, that can cause an echo within her head. There is no otalgia, otorrhea or vertigo/disequilibrium at this time.    Past Medical History:   Diagnosis Date     Anxiety attack      Arthritis      Benign positional vertigo      Conductive hearing loss      Hearing loss     chronic bilat     Noninfectious ileitis     IBS     Obstructive sleep apnea      Osteoporosis      Palpitations      PONV (postoperative nausea and vomiting)      Renal disease     frequency,urgency     Sleep apnea     does not use CPAP     Vertigo        Past Surgical History:   Procedure Laterality Date     BREAST SURGERY      cyst I&D     COLONOSCOPY       ENT SURGERY      tympanoplasty x3     IMPLANT BAHA  4/8/2014    Procedure: IMPLANT BAHA;  Right Oticon Osseointegrated Hearing Device;  Surgeon: Bruno Tijerina MD;   Location: [facility] OR     MYRINGOTOMY, INSERT TUBE, COMBINED  4/5/2012    Procedure:COMBINED MYRINGOTOMY, INSERT TUBE; Left ear Myringotomy with left ear T-tube placement; Surgeon:JESSICA JOINER; Location:[facility] OR     PE TUBES       TONSILLECTOMY & ADENOIDECTOMY       TYMPANOMASTOIDECTOMY Left 10/23/2014    Procedure: TYMPANOMASTOIDECTOMY;  Surgeon: Jessica Joiner MD;  Location: [facility] OR     TYMPANOMASTOIDECTOMY WITH FACIAL MONITORING  4/5/2012    Procedure:TYMPANOMASTOIDECTOMY WITH FACIAL MONITORING; Right Revision Tympanomastoidectomy with Cartilage Graft and Dermamatrix placement, Meatoplasty, Total Osssicular Replacement Prosthesis **latex safe; Surgeon:JESSICA JOINER; Location:[facility] OR     TYMPANOPLASTY       WRIST SURGERY         Family History   Problem Relation Age of Onset     Family History Negative Other      Heart Disease Father      Stomach Problem Mother      Osteoporosis Mother      Cancer Brother        Social History     Tobacco Use     Smoking status: Never     Smokeless tobacco: Never   Substance Use Topics     Alcohol use: Yes     Comment: 2-3/wk     Drug use: No       Patient Supplied Answers to Review of Systems      10/31/2023    10:04 PM    ENT ROS   Neurology Dizzy spells   Ears, Nose, Throat Hearing loss   Gastrointestinal/Genitourinary Constipation    Diarrhea   Musculoskeletal Sore or stiff joints       Physical examination:  Constitutional:  In no acute distress, appears stated age  Eyes:  Extraocular movements intact, no spontaneous nystagmus  Ears:  Both ears examined under the microscope. Right: very scarred mastoid cavity with minimal debris. Left: mastoid cavity clean, TM has areas of monomer and there is an approximately 50% clean perforation with a clear middle ear, no retraction seen.  Respiratory:  No increased work of breathing, wheezing or stridor  Musculoskeletal:  Good upper extremity strength  Skin:  No rashes on the head and neck  Neurologic:  House Brackman 1/6 bilaterally,  ambulating normally  Psychiatric:  Alert, normal affect, answering questions appropriately    Audiogram:  Audiogram 11/7/23 was independently reviewed and compared to multiple prior tests. Right severe upsloping to moderate/severe downsloping to profound mixed loss, left moderate downsloping to severe/profound mixed loss with symmetric normal downsloping to at least moderate sensorineural loss, 100% speech discrimination bilaterally.  Hearing has been stable in 2022. Left hearing declined from 2020 when it was mild downsloping to severe/profound mixed loss.        Assessment and plan:    Valentina Pereyra is seen in consultation from Dr. Nissen.  She is a 73 year old female being seen for left osseointegrated implant with sound processor consult. She is found to be an appropriate candidate for a left osseointegrated implant with sound processor given her surgically altered ear and difficulty wearing a traditional hearing aid.  The benefits and risks were discussed.  The risks include but are not limited to:  Wound infection/wound breakdown with possible need for further surgery and failure of osseointegration requiring further surgery.  There may be a need for prolonged wound cares after surgery.  We discussed the three to six month healing period before the external processor can be fitted.  The patient had her questions answered and wishes to proceed.     Also, patient mentioned experiencing nasal congestion. Instructed her to use saline rinses. Patient understood and was in agreement.     Scribe Disclosure:   I, Desiree Ann, am serving as a scribe; to document services personally performed by Alina Cruz MD -based on data collection and the provider's statements to me.     Provider Disclosure:  I agree with above History, Review of Systems, Physical exam and Plan.  I have reviewed the content of the documentation and have edited it as needed. I have personally performed the services documented here and the  documentation accurately represents those services and the decisions I have made.      Again, thank you for allowing me to participate in the care of your patient.      Sincerely,    Alina Cruz MD

## 2024-01-17 NOTE — PROGRESS NOTES
Valentina Pereyra is seen in consultation from Dr. Nissen.  She is a 73 year old female being seen for left osseointegrated implant with sound processor consult. She is s/p left tympanomastoidectomy 10/23/2014, right Oticon BAHA 4/8/2014, and right revision tympanomastoidectomy with TORP and left T-tube placement 4/5/2012. She is interested in a left osseointegrated implant with sound processor as she has difficult tolerating a traditional hearing aid due to a left perforation.    Today, she reports the right BAHA has had a positive effect on her hearing and would like to repeat the procedure on the left side. She has tried the softband and liked the sound quality. She currently has no otalgia or otorrhea on either side. She reports her hearing is stable bilaterally. She reports she is having a lot of nasal congestion, that can cause an echo within her head. There is no otalgia, otorrhea or vertigo/disequilibrium at this time.    Past Medical History:   Diagnosis Date    Anxiety attack     Arthritis     Benign positional vertigo     Conductive hearing loss     Hearing loss     chronic bilat    Noninfectious ileitis     IBS    Obstructive sleep apnea     Osteoporosis     Palpitations     PONV (postoperative nausea and vomiting)     Renal disease     frequency,urgency    Sleep apnea     does not use CPAP    Vertigo        Past Surgical History:   Procedure Laterality Date    BREAST SURGERY      cyst I&D    COLONOSCOPY      ENT SURGERY      tympanoplasty x3    IMPLANT BAHA  4/8/2014    Procedure: IMPLANT BAHA;  Right Oticon Osseointegrated Hearing Device;  Surgeon: Jessica Tijerina MD;  Location: [facility] OR    MYRINGOTOMY, INSERT TUBE, COMBINED  4/5/2012    Procedure:COMBINED MYRINGOTOMY, INSERT TUBE; Left ear Myringotomy with left ear T-tube placement; Surgeon:JESSICA TIJERINA; Location:[facility] OR    PE TUBES      TONSILLECTOMY & ADENOIDECTOMY      TYMPANOMASTOIDECTOMY Left 10/23/2014    Procedure: TYMPANOMASTOIDECTOMY;  Surgeon:  Jessica Tijerina MD;  Location: [facility] OR    TYMPANOMASTOIDECTOMY WITH FACIAL MONITORING  4/5/2012    Procedure:TYMPANOMASTOIDECTOMY WITH FACIAL MONITORING; Right Revision Tympanomastoidectomy with Cartilage Graft and Dermamatrix placement, Meatoplasty, Total Osssicular Replacement Prosthesis **latex safe; Surgeon:JESSICA TIJERINA; Location:[facility] OR    TYMPANOPLASTY      WRIST SURGERY         Family History   Problem Relation Age of Onset    Family History Negative Other     Heart Disease Father     Stomach Problem Mother     Osteoporosis Mother     Cancer Brother        Social History     Tobacco Use    Smoking status: Never    Smokeless tobacco: Never   Substance Use Topics    Alcohol use: Yes     Comment: 2-3/wk    Drug use: No       Patient Supplied Answers to Review of Systems      10/31/2023    10:04 PM    ENT ROS   Neurology Dizzy spells   Ears, Nose, Throat Hearing loss   Gastrointestinal/Genitourinary Constipation    Diarrhea   Musculoskeletal Sore or stiff joints       Physical examination:  Constitutional:  In no acute distress, appears stated age  Eyes:  Extraocular movements intact, no spontaneous nystagmus  Ears:  Both ears examined under the microscope. Right: very scarred mastoid cavity with minimal debris. Left: mastoid cavity clean, TM has areas of monomer and there is an approximately 50% clean perforation with a clear middle ear, no retraction seen.  Respiratory:  No increased work of breathing, wheezing or stridor  Musculoskeletal:  Good upper extremity strength  Skin:  No rashes on the head and neck  Neurologic:  House Brackman 1/6 bilaterally, ambulating normally  Psychiatric:  Alert, normal affect, answering questions appropriately    Audiogram:  Audiogram 11/7/23 was independently reviewed and compared to multiple prior tests. Right severe upsloping to moderate/severe downsloping to profound mixed loss, left moderate downsloping to severe/profound mixed loss with symmetric normal downsloping to  at least moderate sensorineural loss, 100% speech discrimination bilaterally.  Hearing has been stable in 2022. Left hearing declined from 2020 when it was mild downsloping to severe/profound mixed loss.        Assessment and plan:    Valentina Pereyra is seen in consultation from Dr. Nissen.  She is a 73 year old female being seen for left osseointegrated implant with sound processor consult. She is found to be an appropriate candidate for a left osseointegrated implant with sound processor given her surgically altered ear and difficulty wearing a traditional hearing aid.  The benefits and risks were discussed.  The risks include but are not limited to:  Wound infection/wound breakdown with possible need for further surgery and failure of osseointegration requiring further surgery.  There may be a need for prolonged wound cares after surgery.  We discussed the three to six month healing period before the external processor can be fitted.  The patient had her questions answered and wishes to proceed.     Also, patient mentioned experiencing nasal congestion. Instructed her to use saline rinses. Patient understood and was in agreement.     Scribe Disclosure:   I, Desiree Ann, am serving as a scribe; to document services personally performed by Alina Cruz MD -based on data collection and the provider's statements to me.     Provider Disclosure:  I agree with above History, Review of Systems, Physical exam and Plan.  I have reviewed the content of the documentation and have edited it as needed. I have personally performed the services documented here and the documentation accurately represents those services and the decisions I have made.

## 2024-01-17 NOTE — NURSING NOTE
"Chief Complaint   Patient presents with    Consult     BAHA consult      Blood pressure 112/76, pulse 86, temperature 97.8  F (36.6  C), height 1.753 m (5' 9\"), weight 88.9 kg (196 lb), SpO2 95%.  Jhon Sagastume LPN    "

## 2024-01-17 NOTE — PROGRESS NOTES
AUDIOLOGY REPORT    SUBJECTIVE: Valentina Pereyra is a 73 year old female who was seen in the Audiology Clinic at the [facility] [place], on 1/17/24 for an osseointegrated hearing implant consultation. Valentina has been seen previously and results revealed moderate sloping to severe mixed hearing loss in the left ear and and severe rising to moderate sloping to profound mixed hearing loss in the right ear. Valentina has been previously implanted with a right Oticon BAHA in 2014. She was most recently fit with a right upgraded [facility] [employer] Ponto 5 mini processor on 4/28/22. She was fit with a left hearing aid through [employer] but did not notice any benefit with the hearing aid. The patient has a history of multiple bilateral middle ear surgeries. Valentina Pereyra was medically evaluated by Alina Cruz MD and determined to be cleared for an osseointegrated left device. Today, Valentina notes that her voice echoes in her head and she occasionally hears some reverberation.     OBJECTIVE: Visual inspection revealed that the right surgical site showed crusting around the abutment which was removed in the office using a soft brush. The surgical site is clear with no drainage around the abutment. A shake test indicated good osseointegration of the abutment had occurred. The patient denies any pain, drainage, or swelling of the surgical site today. The echo or reverberation may be due to the crusting around the abutment and lack of snug connection. It is also possible that the echo is related to her current congestion. Based on patient report, the right BAHA was connected to the software and moderate level sounds were increased 2 clicks, and moderate level sounds from 1-4 kHz were increased 2 clicks. Additional programs were added to help her hear better in background noise.     Valentina has been fit with a right Oticon BAHA and desires to be implanted with a left Oticon BAHA as well. The appointment was spent  outlining and comparing the options available. Valentina Pereyra was provided a trial of the Oticon Ponto 5 mini  device on a headband in the office. This was accomplished by connecting the outer processor to a headband. When the device is placed on the mastoid bone (located behind the ear) of the poorer ear, bone vibration is used to stimulate the better cochlea.     Warranty information, life expectancy, and billing of the Oticon, MED-EL, and Cochlear BAHA. For the Oticon/Cochlear BAHA, if surgery is approved, Valentina Pereyra will receive a processor in the surgical kit. This does not include Audiology charges for fitting and follow-up of the device. Audiology will charge the patient a fitting fee and follow-up charges. Valentina Pereyra expressed understanding.    Valentina Pereyra has chosen to move forward with a left surgical option at this time. The system mutually chosen was:       Left: Oticon Ponto 5 mini   COLOR: Silver (so she can tell which device is for the right or left side   BATTERY SIZE: 312   ACCESSORY CHOSEN: TV connector      ASSESSMENT: An osseointegrated hearing implant consultation was conducted today as the patient has been medically cleared for surgery and to use these devices. The patient has decided to pursue a left Oticon Ponto 5 mini at this time. She is encouraged to contact her insurance to determine what coverage she has for a BAHA. The patient expressed understanding.       PLAN: Valentina Pereyra has decided to purse a surgical BAHA. ENT financial support and nursing will be altered to the patient's decision, and Valentina will be contacted by ENT services regarding financial information and coverage for the surgery. They will return to Audiology for fitting of the processor around 3 months post-surgery, or when cleared by the surgeon. A message has been sent to the BAHA implant team.       Jeffrey Perez, CCC-A  Clinical Audiologist  [place] #[id number]

## 2024-01-17 NOTE — PATIENT INSTRUCTIONS
You were seen in the ENT Clinic today by Dr. Cruz. If you have any questions or concerns after your appointment, please contact us (see below)      2.   Please return to clinic for your post-op visits.     Surgery Teaching Surgery Teaching      1.You must have a physical exam (called  history and physical ) within 30 days of surgery. You can do this at the PAC clinic or your family clinic. Bring the Pre-Op Surgery Form (found in the surgery packet that you received in the mail) with you to your primary doctor if you chose to have them complete this. If your doctor is in the [facility], they do not need this form.     2. Ask your doctor what medicines are safe before surgery.          * If you are on any blood-thinners, we will need to make a plan with your INR clinic or prescribing doctor of when you need to stop these medications before surgery    3. NO MOTRIN, IBUPROFEN, ASPIRIN, ALEVE, GARLIC SUPPLEMENTS or FISH OIL x 7 days prior to surgery ( to prevent excess bleeding and bruising at time of surgery).    4. For same-day surgery, you must arrange for an adult to take you home from the Center. An adult must stay with you for the first 24 hours after surgery. You cannot drive for 24 hours, or longer if you are still taking narcotic medications.      5. Stop drinking alcohol at least 24 hours before surgery.      6. Stop or at least cut down on smoking 24 hours before surgery.     7. Take a bath or shower the night before and the morning of surgery (refer to surgery packet for extra information). You should use the soap that we mailed to you or gave in clinic (2 small bottles). Use the entire bottle of soap for each shower, washing from the neck down, then rinsing off. Sleep in clean clothing and use clean bedding sheets. If your doctor does not give you special soap, buy Hibiclens or Alysa-Stat at the drug store or ask the pharmacist to suggest a brand. Do not put on lotion, powder, perfume, deodorant  or make-up after bathing.     8. You can eat a normal meal the night before surgery. Do not eat any solid foods or drink any milk products for 8 hours before surgery. A pre-op nurse will call you the week before surgery to confirm your eating cut-off time, but please listen to this 8-hour rule if you miss their call.     9. You may drink clear liquids until 2 hours before surgery. Clear liquids include water, Gatorade, apple juice, or other liquids you can read through.    10.  If you need FMLA paperwork filled out for your surgery, please send this to us before surgery if you are able (in-person, fax, or mail). DO NOT give this to the pre-op nurses on the day of surgery or it will get lost.    11. Generally, we recommend 1 week off of work for healing after surgery. If you have a labor-intensive job with heavy lifting, you may need a work-modification and we are able to give you a work letter for this so that you can continue to work.              How to Contact Us:  Send a [employer] message to your provider. Our team will respond to you via [employer]. Occasionally, we will need to call you to get further information.  For urgent matters (Monday-Friday), call the ENT Clinic: [phone] and speak with a call center team member - they will route your call appropriately.   If you'd like to speak directly with a nurse, please find our contact information below. We do our best to check voicemail frequently throughout the day, and will work to call you back within 1-2 days. For urgent matters, please use the general clinic phone numbers listed above.      Caron HOROWITZ RN  ENT RN Care Coordinator  Direct: [phone]    Danii ANDERSON LPN  Direct: [phone]

## 2024-01-26 ENCOUNTER — TELEPHONE (OUTPATIENT)
Dept: OTOLARYNGOLOGY | Facility: CLINIC | Age: 74
End: 2024-01-26
Payer: COMMERCIAL

## 2024-01-26 NOTE — TELEPHONE ENCOUNTER
Scheduled surgery with Dr. Cruz on 4/15    Spoke with: Patient    Surgery is located at [facility]    Patient will be seen for their H&P by:    PCP Dr. Yu within 30 days of surgery - Confirmed PCP on file is up to date    Anesthesia type: MAC    Requested Imaging required for surgery: NA    Patient needs scheduled for their 1 week post op    Patient will receive their packet via mail per their preference - Confirmed address on file is up to date    Patient is aware they need a  to & from surgery    Additional comments: Patient will call back if they have any questions or concerns.    Tina Hargrove on 1/26/2024 at 11:29 AM

## 2024-01-26 NOTE — TELEPHONE ENCOUNTER
Left patient a voicemail to schedule surgery for  INSERTION, BONE ANCHORED HEARING AID WITH THE OTICON DEVICE (Left) with Dr. Anthony Hargrove on 1/26/2024 at 10:57 AM

## 2024-01-29 NOTE — TELEPHONE ENCOUNTER
Scheduled patients 1 week post op with Caron on 4/22 at 11am, scheduled post op with Dr. Cruz on 5/7 at 7am    Tina Hargrove, Perioperative Coordinator 1/29/2024 at 8:20 AM

## 2024-04-11 ENCOUNTER — ANESTHESIA EVENT (OUTPATIENT)
Dept: SURGERY | Facility: AMBULATORY SURGERY CENTER | Age: 74
End: 2024-04-11
Payer: COMMERCIAL

## 2024-04-15 ENCOUNTER — ANESTHESIA (OUTPATIENT)
Dept: SURGERY | Facility: AMBULATORY SURGERY CENTER | Age: 74
End: 2024-04-15
Payer: COMMERCIAL

## 2024-04-15 ENCOUNTER — HOSPITAL ENCOUNTER (OUTPATIENT)
Facility: AMBULATORY SURGERY CENTER | Age: 74
Discharge: HOME | End: 2024-04-15
Attending: OTOLARYNGOLOGY
Payer: COMMERCIAL

## 2024-04-15 VITALS
DIASTOLIC BLOOD PRESSURE: 60 MMHG | BODY MASS INDEX: 28.44 KG/M2 | HEART RATE: 55 BPM | SYSTOLIC BLOOD PRESSURE: 106 MMHG | WEIGHT: 192 LBS | RESPIRATION RATE: 12 BRPM | HEIGHT: 69 IN | TEMPERATURE: 97.6 F | OXYGEN SATURATION: 98 %

## 2024-04-15 DIAGNOSIS — G89.18 POSTOPERATIVE PAIN: Primary | ICD-10-CM

## 2024-04-15 PROCEDURE — 99100 ANES PT EXTEME AGE<1 YR&>70: CPT | Performed by: ANESTHESIOLOGY

## 2024-04-15 PROCEDURE — L8690 AUD OSSEO DEV, INT/EXT COMP: HCPCS

## 2024-04-15 PROCEDURE — 69714 IMPL OI IMPLT SKULL PERQ ESP: CPT | Mod: LT | Performed by: OTOLARYNGOLOGY

## 2024-04-15 PROCEDURE — 69710 IMPLANT/REPLACE HEARING AID: CPT | Performed by: ANESTHESIOLOGY

## 2024-04-15 PROCEDURE — 69714 IMPL OI IMPLT SKULL PERQ ESP: CPT | Mod: LT

## 2024-04-15 DEVICE — IMPLANTABLE DEVICE: Type: IMPLANTABLE DEVICE | Site: EAR | Status: FUNCTIONAL

## 2024-04-15 RX ORDER — ONDANSETRON 2 MG/ML
4 INJECTION INTRAMUSCULAR; INTRAVENOUS EVERY 30 MIN PRN
Status: DISCONTINUED | OUTPATIENT
Start: 2024-04-15 | End: 2024-04-16 | Stop reason: HOSPADM

## 2024-04-15 RX ORDER — ONDANSETRON 4 MG/1
4 TABLET, ORALLY DISINTEGRATING ORAL EVERY 30 MIN PRN
Status: DISCONTINUED | OUTPATIENT
Start: 2024-04-15 | End: 2024-04-16 | Stop reason: HOSPADM

## 2024-04-15 RX ORDER — HYDROCODONE BITARTRATE AND ACETAMINOPHEN 5; 325 MG/1; MG/1
1 TABLET ORAL
Status: DISCONTINUED | OUTPATIENT
Start: 2024-04-15 | End: 2024-04-16 | Stop reason: HOSPADM

## 2024-04-15 RX ORDER — SODIUM CHLORIDE, SODIUM LACTATE, POTASSIUM CHLORIDE, CALCIUM CHLORIDE 600; 310; 30; 20 MG/100ML; MG/100ML; MG/100ML; MG/100ML
INJECTION, SOLUTION INTRAVENOUS CONTINUOUS
Status: DISCONTINUED | OUTPATIENT
Start: 2024-04-15 | End: 2024-04-16 | Stop reason: HOSPADM

## 2024-04-15 RX ORDER — NALOXONE HYDROCHLORIDE 0.4 MG/ML
0.1 INJECTION, SOLUTION INTRAMUSCULAR; INTRAVENOUS; SUBCUTANEOUS
Status: DISCONTINUED | OUTPATIENT
Start: 2024-04-15 | End: 2024-04-16 | Stop reason: HOSPADM

## 2024-04-15 RX ORDER — LIDOCAINE HYDROCHLORIDE 20 MG/ML
INJECTION, SOLUTION INFILTRATION; PERINEURAL PRN
Status: DISCONTINUED | OUTPATIENT
Start: 2024-04-15 | End: 2024-04-15

## 2024-04-15 RX ORDER — ACETAMINOPHEN 325 MG/1
650 TABLET ORAL
Status: DISCONTINUED | OUTPATIENT
Start: 2024-04-15 | End: 2024-04-16 | Stop reason: HOSPADM

## 2024-04-15 RX ORDER — DEXAMETHASONE SODIUM PHOSPHATE 10 MG/ML
10 INJECTION, SOLUTION INTRAMUSCULAR; INTRAVENOUS ONCE
Status: COMPLETED | OUTPATIENT
Start: 2024-04-15 | End: 2024-04-15

## 2024-04-15 RX ORDER — CEFAZOLIN SODIUM 2 G/50ML
2 SOLUTION INTRAVENOUS
Status: COMPLETED | OUTPATIENT
Start: 2024-04-15 | End: 2024-04-15

## 2024-04-15 RX ORDER — ONDANSETRON 2 MG/ML
INJECTION INTRAMUSCULAR; INTRAVENOUS PRN
Status: DISCONTINUED | OUTPATIENT
Start: 2024-04-15 | End: 2024-04-15

## 2024-04-15 RX ORDER — CEFAZOLIN SODIUM 2 G/50ML
2 SOLUTION INTRAVENOUS SEE ADMIN INSTRUCTIONS
Status: DISCONTINUED | OUTPATIENT
Start: 2024-04-15 | End: 2024-04-16 | Stop reason: HOSPADM

## 2024-04-15 RX ORDER — HYDROMORPHONE HYDROCHLORIDE 1 MG/ML
0.2 INJECTION, SOLUTION INTRAMUSCULAR; INTRAVENOUS; SUBCUTANEOUS EVERY 5 MIN PRN
Status: DISCONTINUED | OUTPATIENT
Start: 2024-04-15 | End: 2024-04-16 | Stop reason: HOSPADM

## 2024-04-15 RX ORDER — ACETAMINOPHEN 325 MG/1
975 TABLET ORAL ONCE
Status: DISCONTINUED | OUTPATIENT
Start: 2024-04-15 | End: 2024-04-16 | Stop reason: HOSPADM

## 2024-04-15 RX ORDER — OXYCODONE HYDROCHLORIDE 5 MG/1
5 TABLET ORAL
Status: DISCONTINUED | OUTPATIENT
Start: 2024-04-15 | End: 2024-04-16 | Stop reason: HOSPADM

## 2024-04-15 RX ORDER — OXYCODONE HYDROCHLORIDE 5 MG/1
10 TABLET ORAL
Status: DISCONTINUED | OUTPATIENT
Start: 2024-04-15 | End: 2024-04-16 | Stop reason: HOSPADM

## 2024-04-15 RX ORDER — HYDROMORPHONE HYDROCHLORIDE 1 MG/ML
0.4 INJECTION, SOLUTION INTRAMUSCULAR; INTRAVENOUS; SUBCUTANEOUS EVERY 5 MIN PRN
Status: DISCONTINUED | OUTPATIENT
Start: 2024-04-15 | End: 2024-04-16 | Stop reason: HOSPADM

## 2024-04-15 RX ORDER — ACETAMINOPHEN 325 MG/1
975 TABLET ORAL ONCE
Status: COMPLETED | OUTPATIENT
Start: 2024-04-15 | End: 2024-04-15

## 2024-04-15 RX ORDER — PROPOFOL 10 MG/ML
INJECTION, EMULSION INTRAVENOUS CONTINUOUS PRN
Status: DISCONTINUED | OUTPATIENT
Start: 2024-04-15 | End: 2024-04-15

## 2024-04-15 RX ORDER — PROPOFOL 10 MG/ML
INJECTION, EMULSION INTRAVENOUS PRN
Status: DISCONTINUED | OUTPATIENT
Start: 2024-04-15 | End: 2024-04-15

## 2024-04-15 RX ORDER — OXYCODONE HYDROCHLORIDE 5 MG/1
5 TABLET ORAL EVERY 6 HOURS PRN
Qty: 4 TABLET | Refills: 0 | Status: SHIPPED | OUTPATIENT
Start: 2024-04-15 | End: 2024-05-09

## 2024-04-15 RX ORDER — FENTANYL CITRATE 50 UG/ML
50 INJECTION, SOLUTION INTRAMUSCULAR; INTRAVENOUS EVERY 5 MIN PRN
Status: DISCONTINUED | OUTPATIENT
Start: 2024-04-15 | End: 2024-04-16 | Stop reason: HOSPADM

## 2024-04-15 RX ORDER — LIDOCAINE HYDROCHLORIDE AND EPINEPHRINE 10; 10 MG/ML; UG/ML
INJECTION, SOLUTION INFILTRATION; PERINEURAL DAILY PRN
Status: DISCONTINUED | OUTPATIENT
Start: 2024-04-15 | End: 2024-04-15 | Stop reason: HOSPADM

## 2024-04-15 RX ORDER — LIDOCAINE 40 MG/G
CREAM TOPICAL
Status: DISCONTINUED | OUTPATIENT
Start: 2024-04-15 | End: 2024-04-16 | Stop reason: HOSPADM

## 2024-04-15 RX ORDER — FENTANYL CITRATE 50 UG/ML
25 INJECTION, SOLUTION INTRAMUSCULAR; INTRAVENOUS EVERY 5 MIN PRN
Status: DISCONTINUED | OUTPATIENT
Start: 2024-04-15 | End: 2024-04-16 | Stop reason: HOSPADM

## 2024-04-15 RX ADMIN — PROPOFOL 70 MG: 10 INJECTION, EMULSION INTRAVENOUS at 12:09

## 2024-04-15 RX ADMIN — SODIUM CHLORIDE, SODIUM LACTATE, POTASSIUM CHLORIDE, CALCIUM CHLORIDE: 600; 310; 30; 20 INJECTION, SOLUTION INTRAVENOUS at 10:37

## 2024-04-15 RX ADMIN — ACETAMINOPHEN 975 MG: 325 TABLET ORAL at 10:35

## 2024-04-15 RX ADMIN — PROPOFOL 150 MCG/KG/MIN: 10 INJECTION, EMULSION INTRAVENOUS at 11:56

## 2024-04-15 RX ADMIN — LIDOCAINE HYDROCHLORIDE 60 MG: 20 INJECTION, SOLUTION INFILTRATION; PERINEURAL at 11:56

## 2024-04-15 RX ADMIN — DEXAMETHASONE SODIUM PHOSPHATE 4 MG: 10 INJECTION, SOLUTION INTRAMUSCULAR; INTRAVENOUS at 12:08

## 2024-04-15 RX ADMIN — CEFAZOLIN SODIUM 2 G: 2 SOLUTION INTRAVENOUS at 11:48

## 2024-04-15 RX ADMIN — PROPOFOL 70 MG: 10 INJECTION, EMULSION INTRAVENOUS at 11:56

## 2024-04-15 RX ADMIN — ONDANSETRON 4 MG: 2 INJECTION INTRAMUSCULAR; INTRAVENOUS at 12:08

## 2024-04-15 NOTE — DISCHARGE INSTRUCTIONS
"1. Resume your home medications. Take pain medications as indicated. Use docusate to avoid constipation.    2. Wound care  Please leave your dressing in place until you see us in the clinic for follow up.     3. No shower until after your postoperative clinic visit. Keep incision clean and dry, do not scrub, let water and soap run over incision.     4. For the next week, no heavy lifting more than 5 pounds, no strenuous activities. No nose blowing. Sneeze with your mouth open.    5. Please call MD or come to the ED for shortness of breath, trouble breathing, inability to tolerate liquids, intractable dizziness, or signs of infection such as fevers or redness.      Call the [phone] clinic number if you have any questions and concerns during the day and call [phone] at night and ask for \"ENT resident on call\".     6. Follow up with Dr. Cruz as previously scheduled.      [facility]  Home Care Following Anesthesia  For 24 hours after surgery:  Get plenty of rest.  A responsible adult must stay with you for at least 24 hours after you leave the surgery center.  Do not drive or use heavy equipment.  If you have weakness or tingling, don't drive or use heavy equipment until this feeling goes away.   Do not drink alcohol.   Avoid strenuous or risky activities.  Ask for help when climbing stairs.  You may feel lightheaded.  IF so, sit for a few minutes before standing.  Have someone help you get up.   If you have nausea (feel sick to your stomach): Drink only clear liquids such as apple juice, ginger ale, broth or 7-Up.  Rest may also help.  Be sure to drink enough fluids.  Move to a regular diet as you feel able.   You may have a slight fever.  Call the doctor if your fever is over 100 F (37.7 C) (taken under the tongue) or lasts longer than 24 hours.  You may have a dry mouth, a sore throat, muscle aches or trouble sleeping. These should go away after 24 hours.  Do not make " important or legal decisions.   It is recommended to avoid smoking.        Tips for taking pain medications  To get the best pain relief possible, remember these points:  Take pain medications as directed, before pain becomes severe.  Pain medication can upset your stomach: taking it with food may help.  Constipation is a common side effect of pain medication. Drink plenty of  fluids.  Eat foods high in fiber. Take a stool softener if recommended by your doctor or pharmacist.  Do not drink alcohol, drive or operate machinery while taking pain medications.  Ask about other ways to control pain, such as with heat, ice or relaxation.    Tylenol/Acetaminophen Consumption  To help encourage the safe use of acetaminophen, the makers of TYLENOL  have lowered the maximum daily dose for single-ingredient Extra Strength TYLENOL  (acetaminophen) products sold in the [place] from 8 pills per day (4,000 mg) to 6 pills per day (3,000 mg). The dosing interval has also changed from 2 pills every 4-6 hours to 2 pills every 6 hours.  If you feel your pain relief is insufficient, you may take Tylenol/Acetaminophen in addition to your narcotic pain medication.   Be careful not to exceed 3,000 mg of Tylenol/Acetaminophen in a 24 hour period from all sources.  If you are taking extra strength Tylenol/acetaminophen (500 mg), the maximum dose is 6 tablets in 24 hours.  If you are taking regular strength acetaminophen (325 mg), the maximum dose is 9 tablets in 24 hours.    Call a doctor for any of the following:  Signs of infection (fever, growing tenderness at the surgery site, a large amount of drainage or bleeding, severe pain, foul-smelling drainage, redness, swelling).  It has been over 8 to 10 hours since surgery and you are still not able to urinate (pass water).  Headache for over 24 hours.  Numbness, tingling or weakness the day after surgery (if you had spinal anesthesia).  Signs of Covid-19 infection (temperature over 100 degrees,  shortness of breath, cough, loss of taste/smell, generalized body aches, persistent headache, chills, sore throat, nausea/vomiting/diarrhea)  Your doctor is:  Dr. Alina Cruz, ENT Otolaryngology: [phone]               Or dial [phone] and ask for the resident on call for:  ENT Otolaryngology  For emergency care, call the:  [facility]:  [phone] (TTY for hearing impaired: [phone])    [facility]  Home Care Following Anesthesia  For 24 hours after surgery:  Get plenty of rest.  A responsible adult must stay with you for at least 24 hours after you leave the surgery center.  Do not drive or use heavy equipment.  If you have weakness or tingling, don't drive or use heavy equipment until this feeling goes away.   Do not drink alcohol.   Avoid strenuous or risky activities.  Ask for help when climbing stairs.  You may feel lightheaded.  IF so, sit for a few minutes before standing.  Have someone help you get up.   If you have nausea (feel sick to your stomach): Drink only clear liquids such as apple juice, ginger ale, broth or 7-Up.  Rest may also help.  Be sure to drink enough fluids.  Move to a regular diet as you feel able.   You may have a slight fever.  Call the doctor if your fever is over 100 F (37.7 C) (taken under the tongue) or lasts longer than 24 hours.  You may have a dry mouth, a sore throat, muscle aches or trouble sleeping. These should go away after 24 hours.  Do not make important or legal decisions.   It is recommended to avoid smoking.               Tips for taking pain medications  To get the best pain relief possible, remember these points:  Take pain medications as directed, before pain becomes severe.  Pain medication can upset your stomach: taking it with food may help.  Constipation is a common side effect of pain medication. Drink plenty of  fluids.  Eat foods high in fiber. Take a stool softener if recommended by your  doctor or pharmacist.  Do not drink alcohol, drive or operate machinery while taking pain medications.  Ask about other ways to control pain, such as with heat, ice or relaxation.    Tylenol/Acetaminophen Consumption    If you feel your pain relief is insufficient, you may take Tylenol/Acetaminophen in addition to your narcotic pain medication.   Be careful not to exceed 4,000 mg of Tylenol/Acetaminophen in a 24 hour period from all sources.  If you are taking extra strength Tylenol/acetaminophen (500 mg), the maximum dose is 8 tablets in 24 hours.  If you are taking regular strength acetaminophen (325 mg), the maximum dose is 12 tablets in 24 hours.  Tylenol 975 mg given at 1030 AM.   Ok to take more after 430 PM.       Call a doctor for any of the following:  Signs of infection (fever, growing tenderness at the surgery site, a large amount of drainage or bleeding, severe pain, foul-smelling drainage, redness, swelling).  It has been over 8 to 10 hours since surgery and you are still not able to urinate (pass water).  Headache for over 24 hours.  Numbness, tingling or weakness the day after surgery (if you had spinal anesthesia).  Signs of Covid-19 infection (temperature over 100 degrees, shortness of breath, cough, loss of taste/smell, generalized body aches, persistent headache, chills, sore throat, nausea/vomiting/diarrhea)  Your doctor is:  Dr. Alina Cruz, ENT Otolaryngology: [phone]                    Or dial [phone] and ask for the resident on call for:  ENT Otolaryngology  For emergency care, call the:  [facility]:  [phone] (TTY for hearing impaired: [phone])

## 2024-04-15 NOTE — ANESTHESIA POSTPROCEDURE EVALUATION
Patient: Valentina Peeryra    Procedure: Procedure(s):  INSERTION, BONE ANCHORED LEFT HEARING AID WITH THE OTICON DEVICE       Anesthesia Type:  MAC    Note:  Disposition: Outpatient   Postop Pain Control: Uneventful            Sign Out: Well controlled pain   PONV: No   Neuro/Psych: Uneventful            Sign Out: Acceptable/Baseline neuro status   Airway/Respiratory: Uneventful            Sign Out: Acceptable/Baseline resp. status   CV/Hemodynamics: Uneventful            Sign Out: Acceptable CV status; No obvious hypovolemia; No obvious fluid overload   Other NRE: NONE   DID A NON-ROUTINE EVENT OCCUR? No           Last vitals:  Vitals Value Taken Time   /60 04/15/24 1300   Temp 36.4  C (97.6  F) 04/15/24 1300   Pulse 55 04/15/24 1300   Resp 12 04/15/24 1236   SpO2 97 % 04/15/24 1302   Vitals shown include unfiled device data.    Electronically Signed By: Agustin Soriano MD  April 15, 2024  1:43 PM

## 2024-04-15 NOTE — ANESTHESIA CARE TRANSFER NOTE
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    Anesthesia Type:   MAC     Note:    Oropharynx: oropharynx clear of all foreign objects and spontaneously breathing  Level of Consciousness: awake  Oxygen Supplementation: room air    Independent Airway: airway patency satisfactory and stable  Dentition: dentition unchanged  Vital Signs Stable: post-procedure vital signs reviewed and stable  Report to RN Given: handoff report given  Patient transferred to: Phase II  Comments: Report to Phase II RN. Resps easy and regular.   Handoff Report: Identifed the Patient, Identified the Reponsible Provider, Reviewed the pertinent medical history, Discussed the surgical course, Reviewed Intra-OP anesthesia mangement and issues during anesthesia, Set expectations for post-procedure period and Allowed opportunity for questions and acknowledgement of understanding      Vitals:  Vitals Value Taken Time   /54 04/15/24 1236   Temp 36.1  C (97  F) 04/15/24 1236   Pulse 67 04/15/24 1236   Resp 12 04/15/24 1236   SpO2 93 % 04/15/24 1240   Vitals shown include unfiled device data.    Electronically Signed By: MARKY MCDERMOTT CRNA  April 15, 2024  12:41 PM

## 2024-04-15 NOTE — OP NOTE
Date of service:  4/15/24    Preoperative diagnosis:  Bilateral mixed hearing loss    Postoperative diagnosis:  Bilateral mixed hearing loss    Procedure:  Left osseointegrated implant with the Oticon device    Surgeon:  Alina Cruz MD    Fellow:  Dana Ang MD    Anesthesia:  MAC plus local    EBL:  1 mL    Specimens:  None    Complications:  None    Findings:  None    Indications:  Valentina Pereyra has a history of bilateral mixed hearing loss.  She was evaluated and found to be a candidate for an osseointegrated implant with sound processor.  The risks and benefits were discussed with the patient and informed consent was obtained.    Procedure:  The patient was taken to the operating room, placed supine on the operating room table and MAC was given.  Time Out was then performed with confirmation of the patient, site and procedure.  The area behind the ear had been marked and the skin thickness measured.  1% lidocaine with 1:100,000 epinephrine was injected into the area.  The area was then prepped and draped in standard surgical fashion. A 4mm punch was used to remove the skin.  A linear incision was created 1cm above and below the punch.  A cruciate incision was made in the periosteum over the area of the implant and elevated off the skull.  The 3/4mm guide drill was used to drill a 3 mm hole.  No dura was encountered.  The guard was removed and the hole was deepened to 4 mm.  The 4 mm countersink was drilled.  The 4 mm implant with 9 mm abutment was placed with good contact with the bone.  It was solid to shake test.  The skin was then closed using nylon suture.  Allevyn was placed over the skin and a healing cap placed to hold the Allevyn in position.  The patient tolerated the procedure well and there were no complications.  She was returned to anesthesia, awakened and taken to the PACU in stable condition.     IAlina MD, was scrubbed during the entire procedure.

## 2024-04-15 NOTE — ANESTHESIA PREPROCEDURE EVALUATION
Anesthesia Pre-Procedure Evaluation    Patient: Valentina Pereyra   MRN: [id number] : 1950        Procedure : Procedure(s):  INSERTION, BONE ANCHORED LEFT HEARING AID WITH THE OTICON DEVICE          Past Medical History:   Diagnosis Date    Anxiety attack     Arthritis     Benign positional vertigo     Conductive hearing loss     Hearing loss     chronic bilat    Noninfectious ileitis     IBS    Obstructive sleep apnea     Osteoporosis     Palpitations     PONV (postoperative nausea and vomiting)     Renal disease     frequency,urgency    Sleep apnea     does not use CPAP    Vertigo       Past Surgical History:   Procedure Laterality Date    BREAST SURGERY      cyst I&D    COLONOSCOPY      ENT SURGERY      tympanoplasty x3    IMPLANT BAHA  2014    Procedure: IMPLANT BAHA;  Right Oticon Osseointegrated Hearing Device;  Surgeon: Jessica Tijerina MD;  Location: [facility] OR    MYRINGOTOMY, INSERT TUBE, COMBINED  2012    Procedure:COMBINED MYRINGOTOMY, INSERT TUBE; Left ear Myringotomy with left ear T-tube placement; Surgeon:JESSICA TIJERINA; Location:[facility] OR    PE TUBES      TONSILLECTOMY & ADENOIDECTOMY      TYMPANOMASTOIDECTOMY Left 10/23/2014    Procedure: TYMPANOMASTOIDECTOMY;  Surgeon: Jessica Tijerina MD;  Location: [facility] OR    TYMPANOMASTOIDECTOMY WITH FACIAL MONITORING  2012    Procedure:TYMPANOMASTOIDECTOMY WITH FACIAL MONITORING; Right Revision Tympanomastoidectomy with Cartilage Graft and Dermamatrix placement, Meatoplasty, Total Osssicular Replacement Prosthesis **latex safe; Surgeon:JESSICA TIJERINA; Location:[facility] OR    TYMPANOPLASTY      WRIST SURGERY        Allergies   Allergen Reactions    Nkda [No Known Drug Allergy]       Social History     Tobacco Use    Smoking status: Never    Smokeless tobacco: Never   Substance Use Topics    Alcohol use: Yes     Comment: 2-3/wk      Wt Readings from Last 1 Encounters:   24 88.9 kg (196 lb)        Anesthesia Evaluation   Pt has had prior anesthetic.  "Type: General and MAC.    History of anesthetic complications  - PONV.      ROS/MED HX  ENT/Pulmonary: Comment: Sensineurial hearing loss    (+) sleep apnea,                                    (-) recent URI   Neurologic:    (-) no CVA and no TIA   Cardiovascular:    (-) CAD   METS/Exercise Tolerance:     Hematologic:    (-) history of blood clots   Musculoskeletal:       GI/Hepatic:    (-) esophageal disease   Renal/Genitourinary: Comment: Last Cr wnl       Endo:    (-) Type II DM   Psychiatric/Substance Use:     (+) psychiatric history anxiety    (-) chronic opioid use history   Infectious Disease:    (-) Recent Fever   Malignancy:       Other:      (+)  , no H/O Chronic Pain,         Physical Exam    Airway        Mallampati: III   TM distance: > 3 FB   Neck ROM: full   Mouth opening: > 3 cm    Respiratory Devices and Support         Dental       (+) Minor Abnormalities - some fillings, tiny chips      Cardiovascular   cardiovascular exam normal          Pulmonary   pulmonary exam normal                OUTSIDE LABS:  CBC: No results found for: \"WBC\", \"HGB\", \"HCT\", \"PLT\"  BMP: No results found for: \"NA\", \"POTASSIUM\", \"CHLORIDE\", \"CO2\", \"BUN\", \"CR\", \"GLC\"  COAGS: No results found for: \"PTT\", \"INR\", \"FIBR\"  POC: No results found for: \"BGM\", \"HCG\", \"HCGS\"  HEPATIC: No results found for: \"ALBUMIN\", \"PROTTOTAL\", \"ALT\", \"AST\", \"GGT\", \"ALKPHOS\", \"BILITOTAL\", \"BILIDIRECT\", \"EUGENE\"  OTHER: No results found for: \"PH\", \"LACT\", \"A1C\", \"BONNIE\", \"PHOS\", \"MAG\", \"LIPASE\", \"AMYLASE\", \"TSH\", \"T4\", \"T3\", \"CRP\", \"SED\"    Anesthesia Plan    ASA Status:  2    NPO Status:  Will be NPO Appropriate at ...    Anesthesia Type: MAC.      Maintenance: TIVA.        Consents    Anesthesia Plan(s) and associated risks, benefits, and realistic alternatives discussed. Questions answered and patient/representative(s) expressed understanding.     - Discussed: Risks, Benefits and Alternatives for BOTH SEDATION and the PROCEDURE were discussed     - " Discussed with:  Patient      - Extended Intubation/Ventilatory Support Discussed: No.      - Patient is DNR/DNI Status: No     Use of blood products discussed: No .     Postoperative Care       PONV prophylaxis: Ondansetron (or other 5HT-3), Background Propofol Infusion     Comments:               Agustin Soriano MD    I have reviewed the pertinent notes and labs in the chart from the past 30 days and (re)examined the patient.  Any updates or changes from those notes are reflected in this note.

## 2024-04-16 ENCOUNTER — MYC MEDICAL ADVICE (OUTPATIENT)
Dept: AUDIOLOGY | Facility: CLINIC | Age: 74
End: 2024-04-16
Payer: COMMERCIAL

## 2024-04-16 ENCOUNTER — TELEPHONE (OUTPATIENT)
Dept: OTOLARYNGOLOGY | Facility: CLINIC | Age: 74
End: 2024-04-16
Payer: COMMERCIAL

## 2024-04-16 NOTE — TELEPHONE ENCOUNTER
[facility] Call Center    Phone Message    May a detailed message be left on voicemail: yes     Reason for Call: Other: Pt calling to reschedule post op appt with Dr Cruz on 5/9. Please call to reschedule. Thanks.     Action Taken: Other: ENT    Travel Screening: Not Applicable

## 2024-04-16 NOTE — TELEPHONE ENCOUNTER
Pt wanted another time but is going to keep the time as we don't have any other time slots.    Danii Bazzi LPN

## 2024-04-16 NOTE — TELEPHONE ENCOUNTER
Spoke to Caitie but she was in the car and didn't have calendar. Explained need for additional ENT post op and scheduling audiology fitting dates - sent [employer] message for her to view dates/times and contact me to schedule. Confirmed audiology has external processor device.    Yi JACQUES 4/16/24

## 2024-04-18 PROBLEM — Z96.21 STATUS POST PLACEMENT OF BONE ANCHORED HEARING AID (BAHA): Status: ACTIVE | Noted: 2024-04-18

## 2024-04-22 ENCOUNTER — ALLIED HEALTH/NURSE VISIT (OUTPATIENT)
Dept: OTOLARYNGOLOGY | Facility: CLINIC | Age: 74
End: 2024-04-22
Payer: COMMERCIAL

## 2024-04-22 DIAGNOSIS — H90.0 CONDUCTIVE HEARING LOSS, BILATERAL: Primary | ICD-10-CM

## 2024-04-22 PROCEDURE — 99024 POSTOP FOLLOW-UP VISIT: CPT

## 2024-04-22 NOTE — PROGRESS NOTES
Suture removal:   Date sutures applied: 4/15/24         Where (setting) in which they applied:surgery  Description:  Type: sutures  Location: left behind ear  History:    Cause of laceration: BAHA placement  Accompanying Signs & Symptoms: (staff: if yes-describe)  Redness: No  Warmth: No  Drainage: No  Still bleeding: No  Fevers: No  Pt doing well, no pain. Coming back for 2nd post op with Dr Cruz.    Danii Bazzi LPN

## 2024-05-08 NOTE — PROGRESS NOTES
"Valentina Pereyra is being seen for a one month postoperative appointment following a BAHA placement on 04/15/2024. Today, she says that her head is feeling good with no pain or crusting or drainage.     /77   Pulse 61   Temp 97.2  F (36.2  C)   Ht 1.727 m (5' 8\")   Wt 88.5 kg (195 lb)   SpO2 95%   BMI 29.65 kg/m    Physical examination:  Female in no acute distress.  Alert and answering questions appropriately. The left scalp around the abutement is healthy with minor crusting which was removed. The abutment is solid to shake test. The left mastoid cavity was examined with an otoscope and there is minimal debris. The TM appears to be intact, but is very thin with an aerated middle ear.     Assessment and plan: Valentina Pereyra is here for a second postoperative visit after left cochlear implantation.  She is healing as expected after osseointegrated implant with sound processor placement and has been cleared for processor programming.  She has been instructed to begin brushing and was educated on postoperative care. She has been cleared for a haircut. She inquired about additional cleaning of the mastoid bowl cavity. We will get her scheduled with one of our APPs in 6 months. Follow-up as scheduled with myself and audiology.    Scribe Disclosure:   I, Ewa Rodriguez, am serving as a scribe; to document services personally performed by Alina Cruz MD -based on data collection and the provider's statements to me.     Provider Disclosure:  I agree with above History, Review of Systems, Physical exam and Plan.  I have reviewed the content of the documentation and have edited it as needed. I have personally performed the services documented here and the documentation accurately represents those services and the decisions I have made.    "

## 2024-05-09 ENCOUNTER — OFFICE VISIT (OUTPATIENT)
Dept: OTOLARYNGOLOGY | Facility: CLINIC | Age: 74
End: 2024-05-09
Payer: COMMERCIAL

## 2024-05-09 VITALS
WEIGHT: 195 LBS | BODY MASS INDEX: 29.55 KG/M2 | TEMPERATURE: 97.2 F | HEART RATE: 61 BPM | SYSTOLIC BLOOD PRESSURE: 119 MMHG | DIASTOLIC BLOOD PRESSURE: 77 MMHG | HEIGHT: 68 IN | OXYGEN SATURATION: 95 %

## 2024-05-09 DIAGNOSIS — H90.0 CONDUCTIVE HEARING LOSS, BILATERAL: Primary | ICD-10-CM

## 2024-05-09 PROCEDURE — 99024 POSTOP FOLLOW-UP VISIT: CPT | Performed by: OTOLARYNGOLOGY

## 2024-05-09 RX ORDER — ASPIRIN 325 MG
TABLET ORAL DAILY
COMMUNITY

## 2024-05-09 RX ORDER — MULTIVITAMIN,THERAPEUTIC
1 TABLET ORAL DAILY
COMMUNITY

## 2024-05-09 ASSESSMENT — PAIN SCALES - GENERAL: PAINLEVEL: MILD PAIN (3)

## 2024-05-09 NOTE — NURSING NOTE
"Chief Complaint   Patient presents with    RECHECK     Post op     Blood pressure 119/77, pulse 61, temperature 97.2  F (36.2  C), height 1.727 m (5' 8\"), weight 88.5 kg (195 lb), SpO2 95%.  Jhon Sagastume LPN    "

## 2024-05-09 NOTE — Clinical Note
"5/9/2024       RE: Valentina Pereyra  [address] [place] [address]     Dear Colleague,    Thank you for referring your patient, Valentina Pereyra, to the [facility] E[facility] NOSE [facility] TH[facility]AT [facility]IN[facility] [place] at [facility]. Please see a copy of my visit note below.    Valentina Pereyra is being seen for a one month postoperative appointment following a BAHA placement on 04/15/2024. Today, she says that her head is feeling good with no pain or crusting or drainage.     /77   Pulse 61   Temp 97.2  F (36.2  C)   Ht 1.727 m (5' 8\")   Wt 88.5 kg (195 lb)   SpO2 95%   BMI 29.65 kg/m    Physical examination:  Female in no acute distress.  Alert and answering questions appropriately. The left scalp around the abutement is healthy with minor crusting which was removed. The abutment is solid to shake test. The left mastoid cavity was examined with an otoscope and there is minimal debris. The TM appears to be intact, but is very thin with an aerated middle ear.     Assessment and plan: Valentina Pereyra is here for a second postoperative visit after left cochlear implantation.  She is healing as expected after osseointegrated implant with sound processor placement and has been cleared for processor programming.  She has been instructed to begin brushing and was educated on postoperative care. She has been cleared for a haircut. She inquired about additional cleaning of the mastoid bowl cavity. We will get her scheduled with one of our APPs in 6 months. Follow-up as scheduled with myself and audiology.    Scribe Disclosure:   I, Ewa Rodriguez, am serving as a scribe; to document services personally performed by Alina Cruz MD -based on data collection and the provider's statements to me.     Provider Disclosure:  I agree with above History, Review of Systems, Physical exam and Plan.  I have reviewed the content of the documentation and have edited it as needed. I " have personally performed the services documented here and the documentation accurately represents those services and the decisions I have made.        Again, thank you for allowing me to participate in the care of your patient.      Sincerely,    Alina Cruz MD

## 2024-05-09 NOTE — PATIENT INSTRUCTIONS
You were seen in the ENT Clinic today by Dr. Cruz. If you have any questions or concerns after your appointment, please contact us (see below)    Please return to clinic in 6 months with Tina or Sarah for mastoid bowl cleaning    How to Contact Us:  Send a [employer] message to your provider. Our team will respond to you via [employer]. Occasionally, we will need to call you to get further information.  For urgent matters (Monday-Friday), call the ENT Clinic: [phone] and speak with a call center team member - they will route your call appropriately.   If you'd like to speak directly with a nurse, please find our contact information below. We do our best to check voicemail frequently throughout the day, and will work to call you back within 1-2 days. For urgent matters, please use the general clinic phone numbers listed above.    Caron HOROWITZ, RN, BSN  RN Care Coordinator, ENT Clinic  [facility]  Direct: [phone]  Danii WILSON LPN  Direct: [phone]

## 2024-06-06 ENCOUNTER — OFFICE VISIT (OUTPATIENT)
Dept: OTOLARYNGOLOGY | Facility: CLINIC | Age: 74
End: 2024-06-06
Payer: COMMERCIAL

## 2024-06-06 VITALS
HEART RATE: 60 BPM | WEIGHT: 195 LBS | TEMPERATURE: 97.4 F | BODY MASS INDEX: 29.55 KG/M2 | SYSTOLIC BLOOD PRESSURE: 119 MMHG | OXYGEN SATURATION: 97 % | DIASTOLIC BLOOD PRESSURE: 70 MMHG | HEIGHT: 68 IN

## 2024-06-06 DIAGNOSIS — Z96.21 STATUS POST PLACEMENT OF BONE ANCHORED HEARING AID (BAHA): ICD-10-CM

## 2024-06-06 DIAGNOSIS — H90.6 MIXED CONDUCTIVE AND SENSORINEURAL HEARING LOSS OF BOTH EARS: Primary | ICD-10-CM

## 2024-06-06 PROCEDURE — 99024 POSTOP FOLLOW-UP VISIT: CPT | Mod: GC | Performed by: OTOLARYNGOLOGY

## 2024-06-06 RX ORDER — MUPIROCIN 20 MG/G
OINTMENT TOPICAL
Qty: 15 G | Refills: 0 | Status: SHIPPED | OUTPATIENT
Start: 2024-06-06 | End: 2024-06-16

## 2024-06-06 ASSESSMENT — PAIN SCALES - GENERAL: PAINLEVEL: NO PAIN (0)

## 2024-06-06 NOTE — NURSING NOTE
"Chief Complaint   Patient presents with    RECHECK     BAHA post op       Blood pressure 119/70, pulse 60, temperature 97.4  F (36.3  C), height 1.727 m (5' 8\"), weight 88.5 kg (195 lb), SpO2 97%.  Jhon Sagastume LPN    "

## 2024-06-06 NOTE — PROGRESS NOTES
Valentina Pereyra is a 73 year old female being seen for a postoperative visit after left osseointegrated implant; performed on 04/15/2024.  She presents today for her second post-OP follow up. She is doing well overall, but has some continued tenderness around her abutment with some crusting.   She has also had some itching and crusting on her right ear had she would like evaluated.    Her appointment for her processor is next month.     Physical examination:  female in no acute distress.  Alert and answering questions appropriately.  HB 1/6 bilaterally. Abutment is solid. Scalp with a small area inferiorly that is crusted and slightly erythematous, no drainage noted. Right conchal bowl with dry erythematous slightly crusted skin at angle between tragus and antitragus.    Assessment and plan:  Valentina Pereyra is a 73 year old female being seen for a postoperative visit after a left osseointegrated implant on 4/15/24. She has a small area of raw tissue inferiorly to her abutment. Discussed cleaning her abutment daily in a dry environment followed by Bactroban for 10 days. Bactroban may also be applied to the right conchal bowl for 10 days.  She already has appointments for her processor fitting and next mastoid bowl cleaning.     Ronda Mcguire MD    Scribe Disclosure:   I, Ewa Rodriguez, am serving as a scribe; to document services personally performed by Alina Cruz MD -based on data collection and the provider's statements to me.     Provider Disclosure:  I agree with above History, Review of Systems, Physical exam and Plan.  I have reviewed the content of the documentation and have edited it as needed. I have personally performed the services documented here and the documentation accurately represents those services and the decisions I have made.

## 2024-06-06 NOTE — PATIENT INSTRUCTIONS
You were seen in the ENT Clinic today by Dr. Cruz. If you have any questions or concerns after your appointment, please contact us (see below)    The following has been recommended for you based upon your appointment today:  Sent bactroban ointment to the pharmacy- use on BAHA and ear    Please return to clinic as scheduled    How to Contact Us:  Send a [facility] message to your provider. Our team will respond to you via [facility]. Occasionally, we will need to call you to get further information.  For urgent matters (Monday-Friday), call the ENT Clinic: [phone] and speak with a call center team member - they will route your call appropriately.   If you'd like to speak directly with a nurse, please find our contact information below. We do our best to check voicemail frequently throughout the day, and will work to call you back within 1-2 days. For urgent matters, please use the general clinic phone numbers listed above.    Caron HOROWITZ, RN, BSN  RN Care Coordinator, ENT Clinic  [facility]  Direct: [phone]  Danii WILSON LPN  Direct: [phone]

## 2024-06-06 NOTE — Clinical Note
6/6/2024       RE: Valentina Pereyra  [address] [place] [address]     Dear Colleague,    Thank you for referring your patient, Valentina Pereyra, to the [facility] EAR NOSE AND THROAT CLINIC [place] at [facility]. Please see a copy of my visit note below.    Valentina Pereyra is a 73 year old female being seen for a postoperative visit after a left osseointegrated implant; performed on 05/09/2024.  She is s/p left tympanomastoidectomy 10/23/2014, right Oticon BAHA 4/8/2014, and right revision tympanomastoidectomy with TORP and left T-tube placement 4/5/2012. She underwent left Oticon BAHA implantation on 4/14/24. She presents today for post-OP follow up. She is doing well overall, but has some continued tenderness around her abutment with some crusting.   She has also had some itching and crusting on her right ear had she would like evaluated.    Her appointment for her processor is next month.     Physical examination:  female in no acute distress.  Alert and answering questions appropriately.  HB 1/6 bilaterally.  Physical examination:  Skin graft is largely healed with a small strip of raw tissue inferiorly.  Abutment is solid. Right conchal bowl with staph infection at angle between tragus and antitragus      Assessment and plan:  Valentina Pereyra is a 73 year old female being seen for a postoperative visit after a left osseointegrated implant on 4/15/24. She has a small area of raw tissue inferiorly to her abutment. Discussed cleaning her abutment daily in a dry environment followed by Bactroban for 10 days. She appears to have a Staph infection in her right conchal bowl; Bactroban may also be applied to this area for 10 days.  She already has appointments for her processor fitting and next mastoid bowl cleaning.     Ronda Mcguire MD      Scribe Disclosure:   Ewa CHURCHILL, am serving as a scribe; to document services personally performed by  Alina Cruz MD -based on data collection and the provider's statements to me.     Provider Disclosure:  I agree with above History, Review of Systems, Physical exam and Plan.  I have reviewed the content of the documentation and have edited it as needed. I have personally performed the services documented here and the documentation accurately represents those services and the decisions I have made.      Electronically signed by:  ***      Valentina Pereyra is a 73 year old female being seen for a postoperative visit after left osseointegrated implant; performed on 04/15/2024.  She presents today for her second post-OP follow up. She is doing well overall, but has some continued tenderness around her abutment with some crusting.   She has also had some itching and crusting on her right ear had she would like evaluated.    Her appointment for her processor is next month.     Physical examination:  female in no acute distress.  Alert and answering questions appropriately.  HB 1/6 bilaterally. Abutment is solid. Scalp with a small area inferiorly that is crusted and slightly erythematous, no drainage noted. Right conchal bowl with dry erythematous slightly crusted skin at angle between tragus and antitragus.    Assessment and plan:  Valentina Pereyra is a 73 year old female being seen for a postoperative visit after a left osseointegrated implant on 4/15/24. She has a small area of raw tissue inferiorly to her abutment. Discussed cleaning her abutment daily in a dry environment followed by Bactroban for 10 days. Bactroban may also be applied to the right conchal bowl for 10 days.  She already has appointments for her processor fitting and next mastoid bowl cleaning.     Ronda Mcguire MD    Scribe Disclosure:   I, Ewa Rodriguez, am serving as a scribe; to document services personally performed by Alina Cruz MD -based on data collection and the provider's statements to me.     Provider Disclosure:  I agree  with above History, Review of Systems, Physical exam and Plan.  I have reviewed the content of the documentation and have edited it as needed. I have personally performed the services documented here and the documentation accurately represents those services and the decisions I have made.        Again, thank you for allowing me to participate in the care of your patient.      Sincerely,    Alina Cruz MD

## 2024-07-06 ENCOUNTER — HEALTH MAINTENANCE LETTER (OUTPATIENT)
Age: 74
End: 2024-07-06

## 2024-07-16 NOTE — PROGRESS NOTES
AUDIOLOGY REPORT     SUBJECTIVE:  Valentina Pereyra is a 73 year old female who was seen in the Audiology Clinic at the [facility] on 7/17/24 for a fitting of a left Oticon Ponto 5 mini processor. Valentina has been previously implanted with a right Oticon BAHA in 2014. She was most recently fit with a right upgraded Oticon Medical Ponto 5 mini processor on 4/28/22. Valentina was fit with a left hearing aid through [facility] but did not notice any benefit with the hearing aid.  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     OBJECTIVE:  Abutment is securely in place. Abutment site was inspected. Patient notes tenderness, but no pain around the left surgical site. There is some swelling of the skin around the superior portion of the abutment. Patient denies drainage. An ENT nurse was consulted and verbally gave permission to proceed with the fitting. The patient is encouraged to continue cleaning the site and to have her  keep an eye on the site for swelling or drainage. The patient is advised to remove the processor and contact ENT if she has any pain, drainage, or swelling around the abutment. The patient verbally expressed understanding of the plan and agrees to proceed with the fitting. The processor was placed and a feedback test was completed. In-situ measurements were completed. Valentina was oriented to proper hearing aid use, care, cleaning (no water, dry brush), batteries (size: 312, insertion/removal, toxicity, low-battery signal), aid insertion/removal, user booklet, warranty information, storage cases, and other hearing aid details. The patient confirmed understanding of hearing aid use and care, and showed proper insertion of hearing aid and batteries while in the office today. Both devices were programmed together.  Noise reduction was strengthened in the Speech in Noise program and 1.1-6 kHz were increased 3 clicks to help her hear better in her bible study. The processors were successfully connected to the patient's iPhone. They were shown how to utilize the bluetooth to stream audio and phone calls. The patient was given additional resources regarding connectivity and bluetooth support to reference if needed, particularly for assistance in connecting and utilizting the TV connector.    20 minutes were spent in direct analysis and programming of the device.     Hearing aids were programmed as follows:  Program 1: General  Program 2: Speech in Noise  Program 3: Comfort  Program 4: Music       EAR(S) FIT: Left  HEARING AID MODEL NAME: Oticon Medical Ponto 5 mini  HEARING AID STYLE: BAHA  SERIAL NUMBERS: Left: [id number]  WARRANTY END DATE: 4/15/27  ACCESSORY: TV Connector (S/N: [id number])       ASSESSMENT: A left Oticon Ponto 5 mini was fit today. Verification measures were performed. Valentina was given a copy of details on their hearing aid. Patient was counseled that exact out of pocket amounts cannot be determined for hearing aid claims being sent to insurance. Any insurance coverage information presented to the patient is an estimate only, and is not a guarantee of payment. Patient has been advised to check with their own insurance.     PLAN: Valentina should discontinue wearing the left BAHA if she experiences any pain, swelling or drainage at the left surgical site and should contact ENT regarding her concerns. The patient verbally expresses agreements and understanding of the plan. She should return for programming adjustments as needed. Please call this clinic with questions regarding today s appointment.      Jeffrey Perez, CCC-A  Clinical Audiologist  [place] #[id number]        CC: Alina Cruz MD

## 2024-07-17 ENCOUNTER — OFFICE VISIT (OUTPATIENT)
Dept: AUDIOLOGY | Facility: CLINIC | Age: 74
End: 2024-07-17
Payer: COMMERCIAL

## 2024-07-17 DIAGNOSIS — H90.6 MIXED HEARING LOSS, BILATERAL: Primary | ICD-10-CM

## 2024-07-17 PROCEDURE — 92700 UNLISTED ORL SERVICE/PX: CPT | Performed by: AUDIOLOGIST

## 2024-07-17 NOTE — Clinical Note
Hi Dr. Cruz,   I saw Valentina for a BAHA fitting today and there is some tenderness and swelling at the superior portion of the abutment. Danii was shown a picture of the site and advised that it is OK to proceed with the fitting and to have the patient remove the BAHA and contact the clinic if she has any pain or drainage. I wanted to share this information with you for your awareness. Please let me know if you would like to discuss this further.   Thank you,  Andreina Perez, CCC-A Audiologist

## 2024-10-31 ENCOUNTER — TELEPHONE (OUTPATIENT)
Dept: AUDIOLOGY | Facility: CLINIC | Age: 74
End: 2024-10-31
Payer: COMMERCIAL

## 2024-10-31 NOTE — TELEPHONE ENCOUNTER
Spoke with patient regarding scheduling a 30 min BAHA check in 2 weeks. Scheduled patient accordingly as offered with any provider. Sent reminder letter to confirmed address.-Per Patient

## 2024-11-12 ENCOUNTER — OFFICE VISIT (OUTPATIENT)
Dept: AUDIOLOGY | Facility: CLINIC | Age: 74
End: 2024-11-12
Payer: COMMERCIAL

## 2024-11-12 ENCOUNTER — OFFICE VISIT (OUTPATIENT)
Dept: OTOLARYNGOLOGY | Facility: CLINIC | Age: 74
End: 2024-11-12
Attending: OTOLARYNGOLOGY
Payer: COMMERCIAL

## 2024-11-12 VITALS
WEIGHT: 186 LBS | HEART RATE: 64 BPM | SYSTOLIC BLOOD PRESSURE: 121 MMHG | OXYGEN SATURATION: 98 % | TEMPERATURE: 97.6 F | BODY MASS INDEX: 28.28 KG/M2 | DIASTOLIC BLOOD PRESSURE: 61 MMHG

## 2024-11-12 DIAGNOSIS — H90.6 MIXED HEARING LOSS, BILATERAL: Primary | ICD-10-CM

## 2024-11-12 DIAGNOSIS — L03.811 CELLULITIS OF HEAD OR SCALP: Primary | ICD-10-CM

## 2024-11-12 DIAGNOSIS — H95.193 ENCOUNTER FOR MASTOIDECTOMY CAVITY DEBRIDEMENT, BILATERAL: ICD-10-CM

## 2024-11-12 DIAGNOSIS — H72.92 PERFORATION OF TYMPANIC MEMBRANE, LEFT: ICD-10-CM

## 2024-11-12 PROCEDURE — 99202 OFFICE O/P NEW SF 15 MIN: CPT | Mod: 25 | Performed by: REGISTERED NURSE

## 2024-11-12 PROCEDURE — 69220 CLEAN OUT MASTOID CAVITY: CPT | Mod: 50 | Performed by: REGISTERED NURSE

## 2024-11-12 RX ORDER — SULFAMETHOXAZOLE AND TRIMETHOPRIM 800; 160 MG/1; MG/1
1 TABLET ORAL 2 TIMES DAILY
Qty: 20 TABLET | Refills: 0 | Status: SHIPPED | OUTPATIENT
Start: 2024-11-12 | End: 2024-11-22

## 2024-11-12 ASSESSMENT — PAIN SCALES - GENERAL: PAINLEVEL_OUTOF10: NO PAIN (0)

## 2024-11-12 NOTE — NURSING NOTE
Chief Complaint   Patient presents with    RECHECK     6 month follow up   Blood pressure 121/61, pulse 64, temperature 97.6  F (36.4  C), weight 84.4 kg (186 lb), SpO2 98%.    Jhon Sagastume LPN

## 2024-11-12 NOTE — PROGRESS NOTES
Otolaryngology Clinic  November 12, 2024    History of Present Illness:   Valentina Pereyra is a 73 year old female who presents today for bilateral mastoid cavity cleaning. Patient has a history of a left tympanomastoidectomy 10/23/2014, right Oticon BAHA 4/8/2014, and right revision tympanomastoidectomy with TORP and left T-tube placement 4/5/2012 by Dr. Tijerina. Status post left BAHA by Dr. Cruz on 4/15/24. Due for mastoid cavity cleaning today.     Today, patient states that they are doing well. Continues to have some tenderness around the left BAHA abutment. Cleans abutment frequently. Denies any drainage. Does report itching. Denies fever/chills.     Intermittent left ear drainage without pain, vertigo or change in hearing.    Past Medical History:  Past Medical History:   Diagnosis Date    Anxiety attack     Arthritis     Benign positional vertigo     Conductive hearing loss     Hearing loss     chronic bilat    Noninfectious ileitis     IBS    Obstructive sleep apnea     Osteoporosis     Palpitations     PONV (postoperative nausea and vomiting)     Renal disease     frequency,urgency    Sleep apnea     does not use CPAP    Vertigo        Past Surgical History:  Past Surgical History:   Procedure Laterality Date    BREAST SURGERY      cyst I&D    COLONOSCOPY      ENT SURGERY      tympanoplasty x3    IMPLANT BAHA  4/8/2014    Procedure: IMPLANT BAHA;  Right Oticon Osseointegrated Hearing Device;  Surgeon: Jessica Tijerina MD;  Location: [facility] OR    IMPLANT BAHA Left 4/15/2024    Procedure: INSERTION, BONE ANCHORED LEFT HEARING AID WITH THE OTICON DEVICE;  Surgeon: Alina Cruz MD;  Location: [facility] OR    MYRINGOTOMY, INSERT TUBE, COMBINED  4/5/2012    Procedure:COMBINED MYRINGOTOMY, INSERT TUBE; Left ear Myringotomy with left ear T-tube placement; Surgeon:JESSICA TIJERINA; Location:[facility] OR    PE TUBES      TONSILLECTOMY & ADENOIDECTOMY      TYMPANOMASTOIDECTOMY Left 10/23/2014    Procedure: TYMPANOMASTOIDECTOMY;   Surgeon: Jessica Tijerina MD;  Location: [facility] OR    TYMPANOMASTOIDECTOMY WITH FACIAL MONITORING  4/5/2012    Procedure:TYMPANOMASTOIDECTOMY WITH FACIAL MONITORING; Right Revision Tympanomastoidectomy with Cartilage Graft and Dermamatrix placement, Meatoplasty, Total Osssicular Replacement Prosthesis **latex safe; Surgeon:JESSICA TIJERINA; Location:[facility] OR    TYMPANOPLASTY      WRIST SURGERY         Medications:  Current Outpatient Medications   Medication Sig Dispense Refill    alprazolam (XANAX XR) 0.5 MG 24 hr tablet Take 0.5 mg by mouth daily as needed       calcium carbonate-vitamin D 600-400 MG-UNIT CHEW Take 1 tablet by mouth daily      cholecalciferol (VITAMIN D3) 10 mcg (400 units) TABS tablet       escitalopram (LEXAPRO) 20 MG tablet Take 20 mg by mouth daily.       multivitamin, therapeutic (THERA-VIT) TABS tablet Take 1 tablet by mouth daily      sulfamethoxazole-trimethoprim (BACTRIM DS) 800-160 MG tablet Take 1 tablet by mouth 2 times daily for 10 days. 20 tablet 0    Pediatric Multivit-Minerals (GUMMY VITAMINS & MINERALS) chewable tablet Take by mouth daily         Allergies:  Allergies   Allergen Reactions    Nkda [No Known Drug Allergy]         Social History:  Social History     Tobacco Use    Smoking status: Never    Smokeless tobacco: Never   Substance Use Topics    Alcohol use: Yes     Comment: 2-3/wk    Drug use: No       ROS: 10 point ROS neg other than the symptoms noted above in the HPI.    Physical Exam:    /61   Pulse 64   Temp 97.6  F (36.4  C)   Wt 84.4 kg (186 lb)   SpO2 98%   BMI 28.28 kg/m       Constitutional:  The patient was unaccompanied, well-groomed, and in no acute distress.     Skin: Normal:  warm and pink without rash    Neurologic: Alert and oriented x 3.  CN's III-XII within normal limits.  Voice normal.    Psychiatric: The patient's affect was calm, cooperative, and appropriate.     Communication:  Normal; communicates verbally, normal voice quality.    Respiratory:  Breathing comfortably without stridor or exertion of accessory muscles.    Head/Face:  Bilateral BAHA abutments well integrated. Right abutment without swelling, redness, drainage or tenderness. Left side with slight swelling above abutment that is warm, slightly tender and boggy with palpation. There is no drainage or crusting around abutment.      Otologic microscope exam:    Right mastoid cavity was examined under the microscope. Small amount of cerumen in cavity removed with curette. Remainder of the cavity is healthy.     Left mastoid cavity examined under the microscope. Crusting in posterior cavity cleaned with alligator forceps. Crusting across anterior cavity onto TM and into perforation. Completely cleaned with right angled hook and alligator forceps. Stable perforation with clean middle ear space.      Assessment and Plan:  1. Cellulitis of head or scalp (Primary)  Patient with swelling above left abutment suspicious for mild cellulitis. Will treat with oral bactrim. Patient should continue cleaning of abutment. Will update Dr. Cruz regarding swelling.     2.  Encounter for mastoidectomy cavity debridement, bilateral  Bilateral mastoid cavities cleaned under microscopy. Follow up in 6 months for repeat cleaning, particularly for the left mastoid cavity.    Patient will follow up in 6 months for repeat mastoid cavity cleaning.    Sarah Evans DNP, APRN, CNP  Otolaryngology  Head & Neck Surgery  [phone]    20 minutes spent by me on the date of the encounter doing chart review, history and exam, documentation and further activities per the note excluding time spent examining and cleaning ears under microscopy.

## 2024-11-12 NOTE — LETTER
11/12/2024       RE: Valentina Pereyra  [address] [place] [address]     Dear Colleague,    Thank you for referring your patient, Valentina Pereyra, to the [facility] EAR NOSE AND THROAT CLINIC [place] at [facility]. Please see a copy of my visit note below.      Otolaryngology Clinic  November 12, 2024    History of Present Illness:   Valentina Pereyra is a 73 year old female who presents today for bilateral mastoid cavity cleaning. Patient has a history of a left tympanomastoidectomy 10/23/2014, right Oticon BAHA 4/8/2014, and right revision tympanomastoidectomy with TORP and left T-tube placement 4/5/2012 by Dr. Tijerina. Status post left BAHA by Dr. Cruz on 4/15/24. Due for mastoid cavity cleaning today.     Today, patient states that they are doing well. Continues to have some tenderness around the left BAHA abutment. Cleans abutment frequently. Denies any drainage. Does report itching. Denies fever/chills.     Intermittent left ear drainage without pain, vertigo or change in hearing.    Past Medical History:  Past Medical History:   Diagnosis Date     Anxiety attack      Arthritis      Benign positional vertigo      Conductive hearing loss      Hearing loss     chronic bilat     Noninfectious ileitis     IBS     Obstructive sleep apnea      Osteoporosis      Palpitations      PONV (postoperative nausea and vomiting)      Renal disease     frequency,urgency     Sleep apnea     does not use CPAP     Vertigo        Past Surgical History:  Past Surgical History:   Procedure Laterality Date     BREAST SURGERY      cyst I&D     COLONOSCOPY       ENT SURGERY      tympanoplasty x3     IMPLANT BAHA  4/8/2014    Procedure: IMPLANT BAHA;  Right Oticon Osseointegrated Hearing Device;  Surgeon: Bruno Tijerina MD;  Location: [facility] OR     IMPLANT BAHA Left 4/15/2024    Procedure: INSERTION, BONE ANCHORED LEFT HEARING AID WITH THE OTICON DEVICE;  Surgeon: Alina Cruz,  MD;  Location: [facility] OR     MYRINGOTOMY, INSERT TUBE, COMBINED  4/5/2012    Procedure:COMBINED MYRINGOTOMY, INSERT TUBE; Left ear Myringotomy with left ear T-tube placement; Surgeon:JESSICA JOINER; Location:[facility] OR     PE TUBES       TONSILLECTOMY & ADENOIDECTOMY       TYMPANOMASTOIDECTOMY Left 10/23/2014    Procedure: TYMPANOMASTOIDECTOMY;  Surgeon: Jessica Joiner MD;  Location: [facility] OR     TYMPANOMASTOIDECTOMY WITH FACIAL MONITORING  4/5/2012    Procedure:TYMPANOMASTOIDECTOMY WITH FACIAL MONITORING; Right Revision Tympanomastoidectomy with Cartilage Graft and Dermamatrix placement, Meatoplasty, Total Osssicular Replacement Prosthesis **latex safe; Surgeon:JESSICA JOINER; Location:[facility] OR     TYMPANOPLASTY       WRIST SURGERY         Medications:  Current Outpatient Medications   Medication Sig Dispense Refill     alprazolam (XANAX XR) 0.5 MG 24 hr tablet Take 0.5 mg by mouth daily as needed        calcium carbonate-vitamin D 600-400 MG-UNIT CHEW Take 1 tablet by mouth daily       cholecalciferol (VITAMIN D3) 10 mcg (400 units) TABS tablet        escitalopram (LEXAPRO) 20 MG tablet Take 20 mg by mouth daily.        multivitamin, therapeutic (THERA-VIT) TABS tablet Take 1 tablet by mouth daily       sulfamethoxazole-trimethoprim (BACTRIM DS) 800-160 MG tablet Take 1 tablet by mouth 2 times daily for 10 days. 20 tablet 0     Pediatric Multivit-Minerals (GUMMY VITAMINS & MINERALS) chewable tablet Take by mouth daily         Allergies:  Allergies   Allergen Reactions     Nkda [No Known Drug Allergy]         Social History:  Social History     Tobacco Use     Smoking status: Never     Smokeless tobacco: Never   Substance Use Topics     Alcohol use: Yes     Comment: 2-3/wk     Drug use: No       ROS: 10 point ROS neg other than the symptoms noted above in the HPI.    Physical Exam:    /61   Pulse 64   Temp 97.6  F (36.4  C)   Wt 84.4 kg (186 lb)   SpO2 98%   BMI 28.28 kg/m       Constitutional:  The patient  was unaccompanied, well-groomed, and in no acute distress.     Skin: Normal:  warm and pink without rash    Neurologic: Alert and oriented x 3.  CN's III-XII within normal limits.  Voice normal.    Psychiatric: The patient's affect was calm, cooperative, and appropriate.     Communication:  Normal; communicates verbally, normal voice quality.    Respiratory: Breathing comfortably without stridor or exertion of accessory muscles.    Head/Face:  Bilateral BAHA abutments well integrated. Right abutment without swelling, redness, drainage or tenderness. Left side with slight swelling above abutment that is warm, slightly tender and boggy with palpation. There is no drainage or crusting around abutment.      Otologic microscope exam:    Right mastoid cavity was examined under the microscope. Small amount of cerumen in cavity removed with curette. Remainder of the cavity is healthy.     Left mastoid cavity examined under the microscope. Crusting in posterior cavity cleaned with alligator forceps. Crusting across anterior cavity onto TM and into perforation. Completely cleaned with right angled hook and alligator forceps. Stable perforation with clean middle ear space.      Assessment and Plan:  1. Cellulitis of head or scalp (Primary)  Patient with swelling above left abutment suspicious for mild cellulitis. Will treat with oral bactrim. Patient should continue cleaning of abutment. Will update Dr. Cruz regarding swelling.     2.  Encounter for mastoidectomy cavity debridement, bilateral  Bilateral mastoid cavities cleaned under microscopy. Follow up in 6 months for repeat cleaning, particularly for the left mastoid cavity.    Patient will follow up in 6 months for repeat mastoid cavity cleaning.    Sarah Evans DNP, APRN, CNP  Otolaryngology  Head & Neck Surgery  [phone]    20 minutes spent by me on the date of the encounter doing chart review, history and exam, documentation and further activities per the note  excluding time spent examining and cleaning ears under microscopy.      Again, thank you for allowing me to participate in the care of your patient.      Sincerely,    Lauren Evans NP

## 2024-11-13 NOTE — PROGRESS NOTES
AUDIOLOGY REPORT    SUBJECTIVE: Valentina Pereyra is a 73 year old female who was seen in the Audiology Clinic at the [facility] for a fitting of a replacement right Oticon Ponto 5 device that was ordered via loss and damage warranty. 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 Patient's history is significant for a left tympanomastoidectomy 10/23/2014, right revision tympanomastoidectomy with TORP and left T-tube placement 4/5/2012 by Dr. Tijerina. She implanted with a right Oticon abutment on 4/8/14 by Dr. Tijerina and a left Oticon abutment on 4/15/24 by Dr. Cruz. Valentina was most recently fit with right Oticon Ponto 5 mini processor on 4/28/22 and left Oticon Ponto 5 mini processor on 7/17/24. She reportedly lost her right device. A replacement was ordered via loss and damage and she returns to be fit today. Of note, she reports some tenderness around the left BAHA site.    Patient is here for appointment with ENT today. Her device was available to be fit while she was here so she was added on for an appointment.   OBJECTIVE: Abutments were inspected and appeared to secure. Skin surrounding the right appeared healthy; no redness or drainage was noted. Skin surrounding left abutment appeared swollen. She will discuss this with ENT directly following this appointment.   Both processors were connected in the fitting software and paired together. Previous right settings were transferred and new feedback test was completed. Valentina reported good volume and sound clarity. She did not wish to make any programming changes today. Previous processor was forgotten from patient's iPhone. Both processors were then reconnected. She reported use the background noise feature, but has not found it helpful. Reviewed that background noise feature in  iPhone is playing sounds like white noise or ocean waves through the processor and no reducing background noise around her. Reviewed how to use speech in noise program for that type of situation instead.   Approximately 20 minutes was spent in direct analysis assessing, calibrating, programming and confirming processor performance of bone-anchored device  ASSESSMENT: A right replacement BAHA was fit today via loss and damage. Settings were transferred and devices were reconnected to patient's cell phone.     PLAN: Valentina will return for follow-up as needed. She will continue with her ENT appointment today as scheduled. Please call this clinic with questions regarding today s appointment.    Elisabeth Malhotra. CCC-A  Licensed Audiologist   [place] #[id number]

## 2025-03-08 ENCOUNTER — HEALTH MAINTENANCE LETTER (OUTPATIENT)
Age: 75
End: 2025-03-08

## 2025-03-13 ENCOUNTER — OFFICE VISIT (OUTPATIENT)
Dept: OTOLARYNGOLOGY | Facility: CLINIC | Age: 75
End: 2025-03-13
Payer: COMMERCIAL

## 2025-03-13 VITALS
WEIGHT: 185 LBS | HEART RATE: 63 BPM | SYSTOLIC BLOOD PRESSURE: 112 MMHG | OXYGEN SATURATION: 95 % | TEMPERATURE: 97.6 F | DIASTOLIC BLOOD PRESSURE: 52 MMHG | HEIGHT: 68 IN | BODY MASS INDEX: 28.04 KG/M2

## 2025-03-13 DIAGNOSIS — L03.811 CELLULITIS OF HEAD OR SCALP: Primary | ICD-10-CM

## 2025-03-13 DIAGNOSIS — L03.811 CELLULITIS OF HEAD OR SCALP: ICD-10-CM

## 2025-03-13 DIAGNOSIS — J32.4 CHRONIC PANSINUSITIS: Primary | ICD-10-CM

## 2025-03-13 DIAGNOSIS — H95.193 ENCOUNTER FOR MASTOIDECTOMY CAVITY DEBRIDEMENT, BILATERAL: ICD-10-CM

## 2025-03-13 PROCEDURE — 87070 CULTURE OTHR SPECIMN AEROBIC: CPT | Performed by: REGISTERED NURSE

## 2025-03-13 PROCEDURE — 87075 CULTR BACTERIA EXCEPT BLOOD: CPT | Performed by: REGISTERED NURSE

## 2025-03-13 PROCEDURE — 99213 OFFICE O/P EST LOW 20 MIN: CPT | Mod: 25 | Performed by: REGISTERED NURSE

## 2025-03-13 PROCEDURE — 69220 CLEAN OUT MASTOID CAVITY: CPT | Mod: LT | Performed by: REGISTERED NURSE

## 2025-03-13 PROCEDURE — 87102 FUNGUS ISOLATION CULTURE: CPT | Performed by: REGISTERED NURSE

## 2025-03-13 PROCEDURE — 1126F AMNT PAIN NOTED NONE PRSNT: CPT | Performed by: REGISTERED NURSE

## 2025-03-13 PROCEDURE — 3074F SYST BP LT 130 MM HG: CPT | Performed by: REGISTERED NURSE

## 2025-03-13 PROCEDURE — 99000 SPECIMEN HANDLING OFFICE-LAB: CPT | Performed by: PATHOLOGY

## 2025-03-13 PROCEDURE — 3078F DIAST BP <80 MM HG: CPT | Performed by: REGISTERED NURSE

## 2025-03-13 ASSESSMENT — PAIN SCALES - GENERAL: PAINLEVEL_OUTOF10: NO PAIN (0)

## 2025-03-13 NOTE — LETTER
3/13/2025       RE: Valentina Pereyra  [address] [place] [address]     Dear Colleague,    Thank you for referring your patient, Valentina Pereyra, to the [facility] EAR NOSE AND THROAT CLINIC [place] at [facility]. Please see a copy of my visit note below.      Otolaryngology Clinic  March 13, 2025    HPI:  Valentina Pereyra is here for a repeat mastoid cavity debridement and recheck of left BAHA site. Last seen in clinic 11/12/24 for mastoid cavity cleaning.  At that visit, patient was noted to have swelling and tenderness around left Baha site.  Patient reports that has been persistent since surgery.  Bactrim was ordered for possible cellulitis.  Patient did not tolerate this antibiotic due to significant GI upset and diarrhea.    Today, patient presents for recheck.  Denies any otalgia, otorrhea or change in hearing.  Patient continues to have tenderness and swelling around the left Baha site.  Patient brushes site daily.  Denies any drainage, redness or fever/chills.  Tenderness has not worsened but remains persistent.  It is difficult for patient to sleep on the left side due to this tenderness.  Patient has able to wear their Baha bilaterally.  No concerns with the right Baha site.       Physical Exam:    Constitutional:  The patient was unaccompanied, well-groomed, and in no acute distress.     Skin: Normal:  warm and pink without rash    Neurologic: Alert and oriented x 3.  CN's III-XII within normal limits.  Voice normal.    Psychiatric: The patient's affect was calm, cooperative, and appropriate.     Communication:  Normal; communicates verbally, normal voice quality.    Respiratory: Breathing comfortably without stridor or exertion of accessory muscles.    Head/Face:  Bilateral BAHA abutments well integrated. Right abutment without swelling, redness, drainage or tenderness. Left side with slight swelling above abutment that is warm, slightly tender  and boggy with palpation. There is no drainage or crusting around abutment.      Otologic microscope exam:    Right mastoid cavity was examined under the microscope. Small amount of cerumen in cavity removed with curette. Remainder of the cavity is healthy.      Left mastoid cavity examined under the microscope. Crusting in posterior cavity cleaned with alligator forceps. Crusting across anterior cavity onto TM and into perforation. Completely cleaned with right angled hook and alligator forceps. Stable perforation with clean middle ear space.     Assessment and Plan:  1. Cellulitis of head or scalp (Primary)  Continued swelling and tenderness around left abutment site.  This was assessed by Dr. Cruz today.  Culture taken around abutments.  Will treat based on culture.    2. Encounter for mastoidectomy cavity debridement, bilateral  Bilateral mastoid cavities cleaned under microscopy. Follow up in 6 months for repeat cleaning, particularly for the left mastoid cavity.     Patient will follow up in 6 months for repeat mastoid cavity cleaning.    Sarah Evans DNP, APRN, CNP  Otolaryngology  Head & Neck Surgery  [phone]    20 minutes spent on the date of the encounter doing chart review, history and exam, documentation and further activities per the note  excluding time spent examining and cleaning ears under microscopy.       Again, thank you for allowing me to participate in the care of your patient.      Sincerely,    Lauren Evans NP

## 2025-03-13 NOTE — NURSING NOTE
"Chief Complaint   Patient presents with    RECHECK     Pain at [facility]. Also seeing Doctor Cruz      Blood pressure 112/52, pulse 63, temperature 97.6  F (36.4  C), height 1.727 m (5' 8\"), weight 83.9 kg (185 lb), SpO2 95%.  Jhon Sagastume LPN    "

## 2025-03-15 LAB — BACTERIA WND CULT: ABNORMAL

## 2025-03-18 ENCOUNTER — DOCUMENTATION ONLY (OUTPATIENT)
Dept: AUDIOLOGY | Facility: CLINIC | Age: 75
End: 2025-03-18
Payer: COMMERCIAL

## 2025-03-18 NOTE — PROGRESS NOTES
The patient dropped off her left hearing aid at the Audiology Clinic due to reported static sounds and intermittent function. The device was sent to the  for warranty repair and the patient will be contacted via [employer] to  the repaired device.

## 2025-03-19 DIAGNOSIS — J32.4 CHRONIC PANSINUSITIS: Primary | ICD-10-CM

## 2025-03-19 DIAGNOSIS — L03.811 CELLULITIS OF HEAD OR SCALP: ICD-10-CM

## 2025-03-20 LAB
BACTERIA WND CULT: ABNORMAL
BACTERIA WND CULT: NORMAL

## 2025-03-20 NOTE — PROGRESS NOTES
Otolaryngology Clinic  March 13, 2025    HPI:  Valentina Pereyra is here for a repeat mastoid cavity debridement and recheck of left BAHA site. Last seen in clinic 11/12/24 for mastoid cavity cleaning.  At that visit, patient was noted to have swelling and tenderness around left Baha site.  Patient reports that has been persistent since surgery.  Bactrim was ordered for possible cellulitis.  Patient did not tolerate this antibiotic due to significant GI upset and diarrhea.    Today, patient presents for recheck.  Denies any otalgia, otorrhea or change in hearing.  Patient continues to have tenderness and swelling around the left Baha site.  Patient brushes site daily.  Denies any drainage, redness or fever/chills.  Tenderness has not worsened but remains persistent.  It is difficult for patient to sleep on the left side due to this tenderness.  Patient has able to wear their Baha bilaterally.  No concerns with the right Baha site.       Physical Exam:    Constitutional:  The patient was unaccompanied, well-groomed, and in no acute distress.     Skin: Normal:  warm and pink without rash    Neurologic: Alert and oriented x 3.  CN's III-XII within normal limits.  Voice normal.    Psychiatric: The patient's affect was calm, cooperative, and appropriate.     Communication:  Normal; communicates verbally, normal voice quality.    Respiratory: Breathing comfortably without stridor or exertion of accessory muscles.    Head/Face:  Bilateral BAHA abutments well integrated. Right abutment without swelling, redness, drainage or tenderness. Left side with slight swelling above abutment that is warm, slightly tender and boggy with palpation. There is no drainage or crusting around abutment.      Otologic microscope exam:    Right mastoid cavity was examined under the microscope. Small amount of cerumen in cavity removed with curette. Remainder of the cavity is healthy.      Left mastoid cavity examined under the microscope.  Crusting in posterior cavity cleaned with alligator forceps. Crusting across anterior cavity onto TM and into perforation. Completely cleaned with right angled hook and alligator forceps. Stable perforation with clean middle ear space.     Assessment and Plan:  1. Cellulitis of head or scalp (Primary)  Continued swelling and tenderness around left abutment site.  This was assessed by Dr. Cruz today.  Culture taken around abutments.  Will treat based on culture.    2. Encounter for mastoidectomy cavity debridement, bilateral  Bilateral mastoid cavities cleaned under microscopy. Follow up in 6 months for repeat cleaning, particularly for the left mastoid cavity.     Patient will follow up in 6 months for repeat mastoid cavity cleaning.    Sarah Evans DNP, APRN, CNP  Otolaryngology  Head & Neck Surgery  [phone]    20 minutes spent on the date of the encounter doing chart review, history and exam, documentation and further activities per the note  excluding time spent examining and cleaning ears under microscopy.

## 2025-03-27 LAB — BACTERIA WND CULT: NORMAL

## 2025-04-03 LAB — BACTERIA WND CULT: NORMAL

## 2025-04-08 ENCOUNTER — DOCUMENTATION ONLY (OUTPATIENT)
Dept: AUDIOLOGY | Facility: CLINIC | Age: 75
End: 2025-04-08
Payer: COMMERCIAL

## 2025-04-10 LAB — BACTERIA WND CULT: NO GROWTH

## 2025-07-03 ENCOUNTER — HOSPITAL ENCOUNTER (OUTPATIENT)
Age: 75
Discharge: HOME | End: 2025-07-03
Payer: MEDICARE

## 2025-07-03 DIAGNOSIS — N39.0: Primary | ICD-10-CM

## 2025-07-03 DIAGNOSIS — Z12.31: ICD-10-CM

## 2025-07-03 DIAGNOSIS — Z12.31: Primary | ICD-10-CM

## 2025-07-03 PROCEDURE — 77067 SCR MAMMO BI INCL CAD: CPT

## 2025-07-03 PROCEDURE — 87086 URINE CULTURE/COLONY COUNT: CPT

## 2025-07-03 PROCEDURE — 77063 BREAST TOMOSYNTHESIS BI: CPT

## 2025-07-03 PROCEDURE — 87186 SC STD MICRODIL/AGAR DIL: CPT

## 2025-07-13 ENCOUNTER — HEALTH MAINTENANCE LETTER (OUTPATIENT)
Age: 75
End: 2025-07-13

## (undated) DEVICE — BLADE CLIPPER SGL USE 9680

## (undated) DEVICE — OTICON PONTO DRILL WIDE COUNTERSINK WITH STOP 4MM M51122

## (undated) DEVICE — PACK EAR CUSTOM ASC

## (undated) DEVICE — LINEN TOWEL PACK X5 5464

## (undated) DEVICE — PREP POVIDONE-IODINE 7.5% SCRUB 4OZ BOTTLE MDS093945

## (undated) DEVICE — GLOVE BIOGEL PI MICRO SZ 6.5 48565

## (undated) DEVICE — ESU GROUND PAD ADULT W/CORD E7507

## (undated) DEVICE — DRAPE U SPLIT 74X120" 29440

## (undated) DEVICE — SUCTION MANIFOLD NEPTUNE 2 SYS 4 PORT 0702-020-000

## (undated) DEVICE — SOL NACL 0.9% IRRIG 500ML BOTTLE 2F7123

## (undated) DEVICE — TRAY PREP DRY SKIN SCRUB 067

## (undated) DEVICE — CAP SOFT HEALING M52080

## (undated) DEVICE — PUNCH SKIN 5MM P550

## (undated) DEVICE — DRSG ALLEVYN 2X2" NON-ADH 66027643

## (undated) DEVICE — Device

## (undated) DEVICE — PREP POVIDONE IODINE SOLUTION 10% 4OZ BOTTLE 29906-004

## (undated) DEVICE — SU ETHILON 3-0 PS-1 18" 1663H

## (undated) DEVICE — NDL 25GA 1.5" 305127

## (undated) DEVICE — SU VICRYL 3-0 PS-2 27" UND J427H

## (undated) DEVICE — STRAP KNEE/BODY 31143004

## (undated) RX ORDER — LIDOCAINE HYDROCHLORIDE AND EPINEPHRINE 10; 10 MG/ML; UG/ML
INJECTION, SOLUTION INFILTRATION; PERINEURAL
Status: DISPENSED
Start: 2024-04-15

## (undated) RX ORDER — PROPOFOL 10 MG/ML
INJECTION, EMULSION INTRAVENOUS
Status: DISPENSED
Start: 2024-04-15

## (undated) RX ORDER — ACETAMINOPHEN 325 MG/1
TABLET ORAL
Status: DISPENSED
Start: 2024-04-15

## (undated) RX ORDER — CEFAZOLIN SODIUM 2 G/50ML
SOLUTION INTRAVENOUS
Status: DISPENSED
Start: 2024-04-15